# Patient Record
Sex: MALE | Employment: OTHER | URBAN - METROPOLITAN AREA
[De-identification: names, ages, dates, MRNs, and addresses within clinical notes are randomized per-mention and may not be internally consistent; named-entity substitution may affect disease eponyms.]

---

## 2018-01-12 ENCOUNTER — APPOINTMENT (OUTPATIENT)
Dept: CT IMAGING | Age: 68
DRG: 308 | End: 2018-01-12
Attending: EMERGENCY MEDICINE
Payer: MEDICARE

## 2018-01-12 ENCOUNTER — APPOINTMENT (OUTPATIENT)
Dept: GENERAL RADIOLOGY | Age: 68
DRG: 308 | End: 2018-01-12
Attending: EMERGENCY MEDICINE
Payer: MEDICARE

## 2018-01-12 ENCOUNTER — HOSPITAL ENCOUNTER (INPATIENT)
Age: 68
LOS: 4 days | Discharge: HOME HEALTH | DRG: 308 | End: 2018-01-16
Attending: EMERGENCY MEDICINE | Admitting: INTERNAL MEDICINE
Payer: MEDICARE

## 2018-01-12 DIAGNOSIS — Z45.02 AICD DISCHARGE: ICD-10-CM

## 2018-01-12 DIAGNOSIS — I46.9 CARDIAC ARREST (HCC): Primary | ICD-10-CM

## 2018-01-12 DIAGNOSIS — S00.03XA CONTUSION OF SCALP, INITIAL ENCOUNTER: ICD-10-CM
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
## 2018-01-12 DIAGNOSIS — R41.0 CONFUSION: ICD-10-CM

## 2018-01-12 PROBLEM — T14.8XXA CONTUSION: Status: ACTIVE | Noted: 2018-01-12

## 2018-01-12 LAB
ALBUMIN SERPL-MCNC: 3.9 G/DL (ref 3.4–5)
ALBUMIN/GLOB SERPL: 1 {RATIO} (ref 0.8–1.7)
ALP SERPL-CCNC: 79 U/L (ref 45–117)
ALT SERPL-CCNC: 168 U/L (ref 16–61)
ANION GAP SERPL CALC-SCNC: 15 MMOL/L (ref 3–18)
ARTERIAL PATENCY WRIST A: YES
AST SERPL-CCNC: 200 U/L (ref 15–37)
BASE EXCESS BLD CALC-SCNC: 8 MMOL/L
BASOPHILS # BLD: 0 K/UL (ref 0–0.1)
BASOPHILS NFR BLD: 0 % (ref 0–2)
BDY SITE: ABNORMAL
BILIRUB SERPL-MCNC: 0.7 MG/DL (ref 0.2–1)
BUN SERPL-MCNC: 19 MG/DL (ref 7–18)
BUN/CREAT SERPL: 10 (ref 12–20)
CALCIUM SERPL-MCNC: 9.4 MG/DL (ref 8.5–10.1)
CHLORIDE SERPL-SCNC: 95 MMOL/L (ref 100–108)
CK MB CFR SERPL CALC: 1.6 % (ref 0–4)
CK MB SERPL-MCNC: 1.2 NG/ML (ref 5–25)
CK SERPL-CCNC: 76 U/L (ref 39–308)
CO2 SERPL-SCNC: 28 MMOL/L (ref 21–32)
CREAT SERPL-MCNC: 1.86 MG/DL (ref 0.6–1.3)
DIFFERENTIAL METHOD BLD: ABNORMAL
EOSINOPHIL # BLD: 0.2 K/UL (ref 0–0.4)
EOSINOPHIL NFR BLD: 2 % (ref 0–5)
ERYTHROCYTE [DISTWIDTH] IN BLOOD BY AUTOMATED COUNT: 13.7 % (ref 11.6–14.5)
GAS FLOW.O2 O2 DELIVERY SYS: ABNORMAL L/MIN
GAS FLOW.O2 SETTING OXYMISER: 4 L/M
GLOBULIN SER CALC-MCNC: 3.9 G/DL (ref 2–4)
GLUCOSE BLD STRIP.AUTO-MCNC: 288 MG/DL (ref 70–110)
GLUCOSE SERPL-MCNC: 296 MG/DL (ref 74–99)
HCO3 BLD-SCNC: 30.3 MMOL/L (ref 22–26)
HCT VFR BLD AUTO: 41.8 % (ref 36–48)
HGB BLD-MCNC: 14 G/DL (ref 13–16)
LYMPHOCYTES # BLD: 6 K/UL (ref 0.9–3.6)
LYMPHOCYTES NFR BLD: 51 % (ref 21–52)
MAGNESIUM SERPL-MCNC: 1.7 MG/DL (ref 1.6–2.6)
MCH RBC QN AUTO: 30.8 PG (ref 24–34)
MCHC RBC AUTO-ENTMCNC: 33.5 G/DL (ref 31–37)
MCV RBC AUTO: 91.9 FL (ref 74–97)
MONOCYTES # BLD: 0.6 K/UL (ref 0.05–1.2)
MONOCYTES NFR BLD: 5 % (ref 3–10)
NEUTS SEG # BLD: 5 K/UL (ref 1.8–8)
NEUTS SEG NFR BLD: 42 % (ref 40–73)
O2/TOTAL GAS SETTING VFR VENT: 36 %
PCO2 BLD: 35.1 MMHG (ref 35–45)
PH BLD: 7.54 [PH] (ref 7.35–7.45)
PLATELET # BLD AUTO: 249 K/UL (ref 135–420)
PMV BLD AUTO: 10.5 FL (ref 9.2–11.8)
PO2 BLD: 176 MMHG (ref 80–100)
POTASSIUM SERPL-SCNC: 3.3 MMOL/L (ref 3.5–5.5)
PROT SERPL-MCNC: 7.8 G/DL (ref 6.4–8.2)
RBC # BLD AUTO: 4.55 M/UL (ref 4.7–5.5)
SAO2 % BLD: 100 % (ref 92–97)
SERVICE CMNT-IMP: ABNORMAL
SODIUM SERPL-SCNC: 138 MMOL/L (ref 136–145)
SPECIMEN TYPE: ABNORMAL
TOTAL RESP. RATE, ITRR: 20
TROPONIN I SERPL-MCNC: 0.02 NG/ML (ref 0–0.04)
WBC # BLD AUTO: 11.9 K/UL (ref 4.6–13.2)

## 2018-01-12 PROCEDURE — 82962 GLUCOSE BLOOD TEST: CPT

## 2018-01-12 PROCEDURE — 82803 BLOOD GASES ANY COMBINATION: CPT

## 2018-01-12 PROCEDURE — 94762 N-INVAS EAR/PLS OXIMTRY CONT: CPT

## 2018-01-12 PROCEDURE — 36600 WITHDRAWAL OF ARTERIAL BLOOD: CPT

## 2018-01-12 PROCEDURE — 71045 X-RAY EXAM CHEST 1 VIEW: CPT

## 2018-01-12 PROCEDURE — 83735 ASSAY OF MAGNESIUM: CPT | Performed by: EMERGENCY MEDICINE

## 2018-01-12 PROCEDURE — 85025 COMPLETE CBC W/AUTO DIFF WBC: CPT | Performed by: EMERGENCY MEDICINE

## 2018-01-12 PROCEDURE — 80053 COMPREHEN METABOLIC PANEL: CPT | Performed by: EMERGENCY MEDICINE

## 2018-01-12 PROCEDURE — 93005 ELECTROCARDIOGRAM TRACING: CPT

## 2018-01-12 PROCEDURE — 74011250636 HC RX REV CODE- 250/636

## 2018-01-12 PROCEDURE — 99285 EMERGENCY DEPT VISIT HI MDM: CPT

## 2018-01-12 PROCEDURE — 96375 TX/PRO/DX INJ NEW DRUG ADDON: CPT

## 2018-01-12 PROCEDURE — 82550 ASSAY OF CK (CPK): CPT | Performed by: EMERGENCY MEDICINE

## 2018-01-12 PROCEDURE — 74011250636 HC RX REV CODE- 250/636: Performed by: EMERGENCY MEDICINE

## 2018-01-12 PROCEDURE — 70450 CT HEAD/BRAIN W/O DYE: CPT

## 2018-01-12 PROCEDURE — 72125 CT NECK SPINE W/O DYE: CPT

## 2018-01-12 PROCEDURE — 96365 THER/PROPH/DIAG IV INF INIT: CPT

## 2018-01-12 PROCEDURE — 65620000000 HC RM CCU GENERAL

## 2018-01-12 PROCEDURE — 96367 TX/PROPH/DG ADDL SEQ IV INF: CPT

## 2018-01-12 PROCEDURE — 74011250636 HC RX REV CODE- 250/636: Performed by: INTERNAL MEDICINE

## 2018-01-12 RX ORDER — SODIUM CHLORIDE 0.9 % (FLUSH) 0.9 %
5-10 SYRINGE (ML) INJECTION EVERY 8 HOURS
Status: DISCONTINUED | OUTPATIENT
Start: 2018-01-12 | End: 2018-01-16 | Stop reason: HOSPADM

## 2018-01-12 RX ORDER — FENTANYL CITRATE 50 UG/ML
25 INJECTION, SOLUTION INTRAMUSCULAR; INTRAVENOUS ONCE
Status: COMPLETED | OUTPATIENT
Start: 2018-01-12 | End: 2018-01-12

## 2018-01-12 RX ORDER — ONDANSETRON 2 MG/ML
4 INJECTION INTRAMUSCULAR; INTRAVENOUS
Status: COMPLETED | OUTPATIENT
Start: 2018-01-12 | End: 2018-01-12

## 2018-01-12 RX ORDER — INSULIN LISPRO 100 [IU]/ML
INJECTION, SOLUTION INTRAVENOUS; SUBCUTANEOUS
Status: DISCONTINUED | OUTPATIENT
Start: 2018-01-12 | End: 2018-01-16 | Stop reason: HOSPADM

## 2018-01-12 RX ORDER — LORAZEPAM 2 MG/ML
INJECTION INTRAMUSCULAR
Status: COMPLETED
Start: 2018-01-12 | End: 2018-01-12

## 2018-01-12 RX ORDER — ONDANSETRON 2 MG/ML
4 INJECTION INTRAMUSCULAR; INTRAVENOUS
Status: DISPENSED | OUTPATIENT
Start: 2018-01-12 | End: 2018-01-13

## 2018-01-12 RX ORDER — ONDANSETRON 2 MG/ML
4 INJECTION INTRAMUSCULAR; INTRAVENOUS
Status: DISCONTINUED | OUTPATIENT
Start: 2018-01-12 | End: 2018-01-16 | Stop reason: HOSPADM

## 2018-01-12 RX ORDER — MAGNESIUM SULFATE HEPTAHYDRATE 40 MG/ML
2 INJECTION, SOLUTION INTRAVENOUS ONCE
Status: COMPLETED | OUTPATIENT
Start: 2018-01-12 | End: 2018-01-12

## 2018-01-12 RX ORDER — POTASSIUM CHLORIDE 7.45 MG/ML
10 INJECTION INTRAVENOUS
Status: COMPLETED | OUTPATIENT
Start: 2018-01-12 | End: 2018-01-12

## 2018-01-12 RX ORDER — HEPARIN SODIUM 5000 [USP'U]/ML
5000 INJECTION, SOLUTION INTRAVENOUS; SUBCUTANEOUS EVERY 8 HOURS
Status: DISCONTINUED | OUTPATIENT
Start: 2018-01-12 | End: 2018-01-14

## 2018-01-12 RX ORDER — ACETAMINOPHEN 325 MG/1
650 TABLET ORAL
Status: DISCONTINUED | OUTPATIENT
Start: 2018-01-12 | End: 2018-01-16 | Stop reason: HOSPADM

## 2018-01-12 RX ORDER — MAGNESIUM SULFATE 100 %
16 CRYSTALS MISCELLANEOUS AS NEEDED
Status: DISCONTINUED | OUTPATIENT
Start: 2018-01-12 | End: 2018-01-16 | Stop reason: HOSPADM

## 2018-01-12 RX ORDER — DEXTROSE 50 % IN WATER (D50W) INTRAVENOUS SYRINGE
25-50 AS NEEDED
Status: DISCONTINUED | OUTPATIENT
Start: 2018-01-12 | End: 2018-01-16 | Stop reason: HOSPADM

## 2018-01-12 RX ORDER — SODIUM CHLORIDE 0.9 % (FLUSH) 0.9 %
5-10 SYRINGE (ML) INJECTION AS NEEDED
Status: DISCONTINUED | OUTPATIENT
Start: 2018-01-12 | End: 2018-01-16 | Stop reason: HOSPADM

## 2018-01-12 RX ADMIN — POTASSIUM CHLORIDE 10 MEQ: 7.46 INJECTION, SOLUTION INTRAVENOUS at 15:48

## 2018-01-12 RX ADMIN — FENTANYL CITRATE 25 MCG: 50 INJECTION INTRAMUSCULAR; INTRAVENOUS at 14:54

## 2018-01-12 RX ADMIN — ONDANSETRON 4 MG: 2 INJECTION INTRAMUSCULAR; INTRAVENOUS at 19:44

## 2018-01-12 RX ADMIN — LORAZEPAM 0.5 MG: 2 INJECTION, SOLUTION INTRAMUSCULAR; INTRAVENOUS at 14:51

## 2018-01-12 RX ADMIN — AMIODARONE HYDROCHLORIDE 150 MG: 1.5 INJECTION, SOLUTION INTRAVENOUS at 17:20

## 2018-01-12 RX ADMIN — POTASSIUM CHLORIDE 10 MEQ: 7.46 INJECTION, SOLUTION INTRAVENOUS at 21:29

## 2018-01-12 RX ADMIN — MAGNESIUM SULFATE IN WATER 2 G: 40 INJECTION, SOLUTION INTRAVENOUS at 17:21

## 2018-01-12 RX ADMIN — AMIODARONE HYDROCHLORIDE 1 MG/MIN: 1.8 INJECTION, SOLUTION INTRAVENOUS at 20:00

## 2018-01-12 NOTE — H&P
History and Physical      NAME:  Chuck Saab   :   1950   MRN:   [id number]     Date/Time:  2018     CHIEF COMPLAINT: ICD shock     HISTORY OF PRESENT ILLNESS:     Mr. Carmelita Marshall is a 74 y.o.   male with a PMH of Ischemic cardiomyopathy with EF 20%, s/p BI-V ICD, chronic systolic CHF, ventricular tachycardia, CAD s/p CABG, HTN and DM-2 who presents with c/c of ICD shock. He was recently admitted 17-17 at [facility] for ventricular tachycardia and discharged home on amiodarone. Apparently his ICD has shocked him today. He fell down in the parking lot and become unresponsive. Per EMS, patient had no pulse and his rhythm was in [address] [facility]. His devise was discharged. Here in ED he is waking up and able to communicate. He denies chest pain. Cardiology and intensivist has been consulted and being admitted to ICU. Past Medical History:   Diagnosis Date    CAD (coronary artery disease)         Past Surgical History:   Procedure Laterality Date    CARDIAC SURG PROCEDURE UNLIST      CABG        Social History   Substance Use Topics    Smoking status: Unknown If Ever Smoked    Smokeless tobacco: Not on file    Alcohol use No        History reviewed. No pertinent family history.      No Known Allergies     Prior to Admission medications    Not on File       REVIEW OF SYSTEMS:     CONSTITUTIONAL: No Fever, No chills, No weight loss, No Night sweats  HEENT:  No epistaxis, No diff in swallowing  CVS: No chest pain, No palpitations, No syncope, No peripheral edema, No PND, No orthopnea  RS: No shortness of breath, No cough, No hemoptysis, No pleuritic chest pain  GI: No abd pain, No vomitting, No diarrhea, No hematemesis, No rectal bleeding, No acid reflux or heartburn  NEURO: No focal weakness, No headaches, No seizures  PSYCH: No anxiety, No depression  MUSCULOSKLETAL: No joint pain or swelling  : No hematuria or dysuria  SKIN: No rash      Physical Exam:    VITALS:    Vital signs reviewed; most recent are:    Visit Vitals    /81    Pulse 67    SpO2 100%     SpO2 Readings from Last 6 Encounters:   01/12/18 100%        No intake or output data in the 24 hours ending 01/12/18 3505       GENERAL: Not in acute distress  HEENT: pink conjunctiva, un icteric sclera,   NECK: No lymphadenopthy or thyroid swelling, JVD not seen  LYMPH: No supraclavicular or cervical or axillary nodes on both sides  CVS: S1S2, No murmurs, No gallop or rub  RS: CTA, No wheezing or crackles  Abd: Soft, non tender, not distended, No guarding, No rigidity  NEURO:  No focal neurologic deficits   Extrm: no leg edema or swelling   Skin: No rash      Labs:  Recent Results (from the past 24 hour(s))   CBC WITH AUTOMATED DIFF    Collection Time: 01/12/18  2:54 PM   Result Value Ref Range    WBC 11.9 4.6 - 13.2 K/uL    RBC 4.55 (L) 4.70 - 5.50 M/uL    HGB 14.0 13.0 - 16.0 g/dL    HCT 41.8 36.0 - 48.0 %    MCV 91.9 74.0 - 97.0 FL    MCH 30.8 24.0 - 34.0 PG    MCHC 33.5 31.0 - 37.0 g/dL    RDW 13.7 11.6 - 14.5 %    PLATELET 605 793 - 092 K/uL    MPV 10.5 9.2 - 11.8 FL    NEUTROPHILS 42 40 - 73 %    LYMPHOCYTES 51 21 - 52 %    MONOCYTES 5 3 - 10 %    EOSINOPHILS 2 0 - 5 %    BASOPHILS 0 0 - 2 %    ABS. NEUTROPHILS 5.0 1.8 - 8.0 K/UL    ABS. LYMPHOCYTES 6.0 (H) 0.9 - 3.6 K/UL    ABS. MONOCYTES 0.6 0.05 - 1.2 K/UL    ABS. EOSINOPHILS 0.2 0.0 - 0.4 K/UL    ABS.  BASOPHILS 0.0 0.0 - 0.1 K/UL    DF AUTOMATED     METABOLIC PANEL, COMPREHENSIVE    Collection Time: 01/12/18  2:54 PM   Result Value Ref Range    Sodium 138 136 - 145 mmol/L    Potassium 3.3 (L) 3.5 - 5.5 mmol/L    Chloride 95 (L) 100 - 108 mmol/L    CO2 28 21 - 32 mmol/L    Anion gap 15 3.0 - 18 mmol/L    Glucose 296 (H) 74 - 99 mg/dL    BUN 19 (H) 7.0 - 18 MG/DL    Creatinine 1.86 (H) 0.6 - 1.3 MG/DL    BUN/Creatinine ratio 10 (L) 12 - 20      GFR est AA 44 (L) >60 ml/min/1.73m2    GFR est non-AA 37 (L) >60 ml/min/1.73m2    Calcium 9.4 8.5 - 10.1 MG/DL    Bilirubin, total 0.7 0.2 - 1.0 MG/DL    ALT (SGPT) 168 (H) 16 - 61 U/L    AST (SGOT) 200 (H) 15 - 37 U/L    Alk. phosphatase 79 45 - 117 U/L    Protein, total 7.8 6.4 - 8.2 g/dL    Albumin 3.9 3.4 - 5.0 g/dL    Globulin 3.9 2.0 - 4.0 g/dL    A-G Ratio 1.0 0.8 - 1.7     CARDIAC PANEL,(CK, CKMB & TROPONIN)    Collection Time: 01/12/18  2:54 PM   Result Value Ref Range    CK 76 39 - 308 U/L    CK - MB 1.2 <3.6 ng/ml    CK-MB Index 1.6 0.0 - 4.0 %    Troponin-I, Qt. 0.02 0.0 - 0.045 NG/ML   MAGNESIUM    Collection Time: 01/12/18  2:54 PM   Result Value Ref Range    Magnesium 1.7 1.6 - 2.6 mg/dL   EKG, 12 LEAD, INITIAL    Collection Time: 01/12/18  3:55 PM   Result Value Ref Range    Ventricular Rate 68 BPM    Atrial Rate 68 BPM    P-R Interval 204 ms    QRS Duration 136 ms    Q-T Interval 602 ms    QTC Calculation (Bezet) 640 ms    Calculated P Axis 78 degrees    Calculated R Axis -132 degrees    Calculated T Axis -8 degrees    Diagnosis       Electronic ventricular pacemaker  No previous ECGs available           Active Problems:    * No active hospital problems. *      Assessment:       1. V-tac cardiac arrest s/p ICD shock  2. Recurrent Vent tachycardia recently admitted over [facility], on amiodarone  3. Ischemic cardiomyopathy with EF 20%, s/p BI-V ICD,   4. Chronic systolic CHF,   5. Paroxysmal Afib  6. CAD s/p CABG,   7. HTN, controlled  8. DM-2, uncontrolled with hyperglycemia  9. JOELLE    Plan:       · Admit to ICU  · Started on IV amiodarone drip per cardiology  · Cardiology considering  ventricular tachycardia ablation  · Will review his other home medications when available  · Patient on eliquis at home  · Monitor electrolytes and replete as needed  · Monitor blood glucose.  Will keep him on SSI for now  · Full code  · D/w his brother at bedside    Total time:  70 minutes             _______________________________________________________________________        Attending Physician:  Marline Espino MD

## 2018-01-12 NOTE — IP AVS SNAPSHOT
[address] [address] 
[phone] Patient: Jeff Chaeny MRN: [id number] About your hospitalization You were admitted on:  January 12, 2018 You last received care in the:  [facility] 2 CV INTNSV CARE You were discharged on:  January 16, 2018 Why you were hospitalized Your primary diagnosis was:  Not on File Your diagnoses also included:  Cardiac Arrest (Hcc), Contusion, Hypokalemia, Metabolic Alkalosis, Dm Type 2 (Diabetes Mellitus, Type 2) (Hcc), Systolic Dysfunction Follow-up Information Follow up With Details Comments Contact Info None   None ([phone]) Patient stated that they have no PCP On 1/17/2018 @ 1:20 with dr.michael bright On 1/22/2018 @ 10:45 with md alejandro multani pls bring red d/c chart with the [facility] appt/time Your Scheduled Appointments Tuesday January 30, 2018  1:45 PM EST  
ESTABLISHED PATIENT with Liban Nogueira MD  
[facility] ([facility]) [address] [place] [address]  
[phone] Discharge Orders None A check larisa indicates which time of day the medication should be taken. My Medications START taking these medications Instructions Each Dose to Equal  
 Morning Noon Evening Bedtime  
 aspirin 81 mg chewable tablet Start taking on:  1/17/2018 Your last dose was:  0900 Take 1 Tab by mouth daily. 81 mg  
    
  
   
   
   
  
 polyethylene glycol 17 gram packet Commonly known as:  Ladi Farrier Your last dose was:  0900 Your next dose is:  tomorrow Take 1 Packet by mouth daily. 17 g  
    
  
   
   
   
  
 spironolactone 25 mg tablet Commonly known as:  ALDACTONE Start taking on:  1/17/2018 Your last dose was:  0900 Your next dose is:  tomorrow Take 1 Tab by mouth daily.   
 25 mg  
    
  
   
   
   
  
  
 CONTINUE taking these medications Instructions Each Dose to Equal  
 Morning Noon Evening Bedtime * allopurinol 100 mg tablet Commonly known as:  Nomi Christie Your last dose was:  0900 Take 200 mg by mouth every evening. Indications: prevention of acute gout attack, 300mg in am/ 200 mg in the evening  
 200 mg  
    
  
   
   
  
   
  
 * allopurinol 300 mg tablet Commonly known as:  Nomi Pratik Your next dose is:  6pm  
   
 Take 300 mg by mouth daily. Indications: Total = 500mg daily 300 mg  
    
  
   
   
  
   
  
 AMARYL 4 mg tablet Generic drug:  glimepiride Your last dose was:  Prior to admission Your next dose is: With dinner Take 4 mg by mouth two (2) times daily (with meals). 4 mg  
    
  
   
   
  
   
  
 amiodarone 200 mg tablet Commonly known as:  CORDARONE Your last dose was:  0900 Your next dose is: With dinner Take 200 mg by mouth two (2) times daily (with meals). 200 mg  
    
  
   
   
  
   
  
 atorvastatin 20 mg tablet Commonly known as:  LIPITOR Take 20 mg by mouth nightly. 20 mg  
    
   
   
   
  
  
 carvedilol 3.125 mg tablet Commonly known as:  [place] Dine Your last dose was:  yesterday Your next dose is: With dinner Take  by mouth two (2) times daily (with meals). cyclobenzaprine 10 mg tablet Commonly known as:  FLEXERIL Take 10 mg by mouth three (3) times daily as needed for Muscle Spasm(s). 10 mg  
    
   
   
   
  
 docusate sodium 100 mg capsule Commonly known as:  Mandy  Your last dose was:  today Take 100 mg by mouth daily. 100 mg  
    
  
   
   
   
  
 ELIQUIS 5 mg tablet Generic drug:  apixaban Your last dose was:  0900 Your next dose is:  9pm  
   
 Take 5 mg by mouth every twelve (12) hours. Indications: PREVENT THROMBOEMBOLISM IN CHRONIC ATRIAL FIBRILLATION  
 5 mg  
    
  
   
   
   
  
  
 famotidine 20 mg tablet Pt's c/o Urination difficulty and Dizzy feeling upon ambulation Commonly known as:  PEPCID Your last dose was:  0900 Your next dose is:  Dinner time Take 20 mg by mouth two (2) times a day. 20 mg  
    
  
   
   
  
   
  
 magnesium oxide 400 mg tablet Commonly known as:  MAG-OX Your last dose was:  0900 Your next dose is:  tomorrow Take 400 mg by mouth daily. 400 mg  
    
  
   
   
   
  
 metFORMIN 850 mg tablet Commonly known as:  GLUCOPHAGE Your last dose was:  Prior to admission Your next dose is: With dinner Take  by mouth two (2) times a day. potassium chloride SA 10 mEq capsule Commonly known as:  Louanna Sample Your last dose was:  0900 Your next dose is:  bedtime Take 20 mEq by mouth two (2) times a day. 20 mEq Senna 8.6 mg tablet Generic drug:  senna Take 1 Tab by mouth two (2) times daily as needed for Constipation. 1 Tab * Notice: This list has 2 medication(s) that are the same as other medications prescribed for you. Read the directions carefully, and ask your doctor or other care provider to review them with you. STOP taking these medications   
 torsemide 20 mg tablet Commonly known as:  DEMADEX Where to Get Your Medications Information on where to get these meds will be given to you by the nurse or doctor. ! Ask your nurse or doctor about these medications  
  aspirin 81 mg chewable tablet  
 polyethylene glycol 17 gram packet  
 spironolactone 25 mg tablet Discharge Instructions None [facility] Announcement We are excited to announce that we are making your provider's discharge notes available to you in [facility]. You will see these notes when they are completed and signed by the physician that discharged you from your recent hospital stay.   If you have any questions or concerns about any information you see in [employer], please call the Health Information Department where you were seen or reach out to your Primary Care Provider for more information about your plan of care. Introducing [facility]! Austin Bergman introduces Bay[employer] patient portal. Now you can access parts of your medical record, email your doctor's office, and request medication refills online. 1. In your internet browser, go to [email or web address] 2. Click on the First Time User? Click Here link in the Sign In box. You will see the New Member Sign Up page. 3. Enter your Baytex Access Code exactly as it appears below. You will not need to use this code after youve completed the sign-up process. If you do not sign up before the expiration date, you must request a new code. · Baytex Access Code: [email or web address] Expires: 4/16/2018  3:34 PM 
 
4. Enter the last four digits of your Social Security Number (xxxx) and Date of Birth (mm/dd/yyyy) as indicated and click Submit. You will be taken to the next sign-up page. 5. Create a Baytex ID. This will be your Baytex login ID and cannot be changed, so think of one that is secure and easy to remember. 6. Create a Baytex password. You can change your password at any time. 7. Enter your Password Reset Question and Answer. This can be used at a later time if you forget your password. 8. Enter your e-mail address. You will receive e-mail notification when new information is available in [address] 9. Click Sign Up. You can now view and download portions of your medical record. 10. Click the Download Summary menu link to download a portable copy of your medical information. If you have questions, please visit the Frequently Asked Questions section of the Baytex website. Remember, [employer]tex is NOT to be used for urgent needs. For medical emergencies, dial 911. Now available from your iPhone and Android! Unresulted Labs-Please follow up with your PCP about these lab tests Order Current Status ALDOSTERONE/RENIN ACTIVITY Preliminary result Providers Seen During Your Hospitalization Provider Specialty Primary office phone Ricky Lino MD Emergency Medicine [phone] Amanda Alva MD Internal Medicine [phone] Duy Hi MD [facility] [phone] Liss Perez MD Internal Medicine [phone] Marita Heimlich, MD Internal Medicine [phone] Leroy Ivy MD Internal Medicine [phone] Your Primary Care Physician (PCP) Primary Care Physician Office Phone Office Fax NONE ** None ** ** None ** You are allergic to the following No active allergies Recent Documentation Height Weight BMI Smoking Status 1.88 m 86.4 kg 24.46 kg/m2 Unknown If Ever Smoked Emergency Contacts Name Discharge Info Relation Home Work Mobile Ricky Kohli DECLINED CAREGIVER [4] Brother [24] [phone] Kota Hawks DECLINED CAREGIVER [4] Brother [24] [phone] Luli Andrews DECLINED CAREGIVER [4] Sister [23] [phone] Patient Belongings The following personal items are in your possession at time of discharge: 
  Dental Appliances: None  Visual Aid: None      Home Medications: None   Jewelry: None  Clothing: None    Other Valuables: None Discharge Instructions Attachments/References ICD (IMPLANTABLE CARDIOVERTER-DEFIBRILLATOR): GENERAL INFO (ENGLISH) ICD SHOCKS: GENERAL INFO (ENGLISH) ATRIAL FIBRILLATION (ENGLISH) ELECTROLYTE IMBALANCE (ENGLISH) HYPOKALEMIA (ENGLISH) Patient Handouts Learning About ICDs (Implantable Cardioverter-Defibrillators) What is an ICD (implantable cardioverter-defibrillator)? An ICD (implantable cardioverter-defibrillator) is a small, battery-powered device. It fixes serious changes in your heartbeat.  ICDs are used in people who have had a life-threatening heart rhythm or are at high risk of having one. The ICD is placed under the skin of the chest. It's attached to one or two wires (called leads). Most of the time, these leads go into the heart through a vein. How does an ICD work? An ICD is always checking your heart rate and rhythm. If the ICD detects a life-threatening, rapid heart rhythm, it tries to slow the rhythm to get it back to normal. If the bad rhythm doesn't stop, the ICD sends an electric shock to the heart. This restores a normal rhythm. The device then goes back to its watchful mode. An ICD also can fix a heart rate that is too fast or too slow. It does this without using a shock. It can send out electrical pulses to speed up a heart rate that is too slow. Or it can slow down a fast heart rate by matching the pace and bringing the heart rate back to normal. 
Your doctor will check the ICD regularly to make sure it is working right and isn't causing any problems. Your doctor will also check the battery to see if it needs to be replaced. How is an ICD placed? Your doctor will put the ICD under the skin in your chest during minor surgery. You will likely have medicine to make you feel relaxed and sleepy during the surgery. Your doctor makes a small cut (incision) in your upper chest. He or she puts one or two leads (wires) through the cut. Most of the time, the leads go into a large blood vessel in the upper chest. Then your doctor guides the leads through the blood vessel into your heart. Your doctor connects the leads to the ICD and places it in your chest. Then the incision is closed. Your doctor also programs the ICD. Most people spend the night in the hospital, just to make sure that the device is working and that there are no problems from the surgery. How does it feel to get a shock? The shock from an ICD hurts briefly. People feel it in different ways. It's been described as feeling like a punch in the chest. But the shock is a sign that the ICD is doing its job. It's there to save your life. You won't feel any pain if the ICD uses electrical pulses to fix a heart rate that is too fast or too slow. There's no way to know how often a shock might occur. It might never happen. Not knowing when or if a shock might happen may make you nervous. Knowing what to do if you get shocked can help. Ask your doctor for an action plan. This plan will guide you step-by-step if a shock happens. What else should you know? You can live a normal life with your ICD. Here are a few tips for living well with your ICD. · Avoid strong magnetic and electrical fields. These can keep your device from working right. Most office equipment and home appliances are safe to use. Check with your doctor about which things you should use with caution and which you should stay away from. · Be sure that any doctor, dentist, or other health professional you see knows that you have an ICD. · Always carry a card that tells what kind of device you have. Wear medical alert jewelry that says you have an ICD. You can buy this at most drugstores. · If you do get a shock, follow your action plan for what to do. · You can lead an active life with an ICD. Ask your doctor what sort of activity and intensity is safe for you. As you plan for your future and your end of life, be sure to include plans for your ICD. You can make the decision to turn off your ICD as part of the medical treatment you want at the end of life. Follow-up care is a key part of your treatment and safety. Be sure to make and go to all appointments, and call your doctor if you are having problems. It's also a good idea to know your test results and keep a list of the medicines you take. Where can you learn more? Go to http://josiane-halima.info/. Enter J468 in the search box to learn more about \"Learning About ICDs (Implantable Cardioverter-Defibrillators). \" 
Current as of: September 21, 2016 Content Version: 11.4 © 8341-5740 [employer]. Care instructions adapted under license by [employer] (which disclaims liability or warranty for this information). If you have questions about a medical condition or this instruction, always ask your healthcare professional. [address] any warranty or liability for your use of this information. Learning About ICD Shocks What are ICDs and ICD shocks? An implantable cardioverter-defibrillator (ICD) is a device that is placed under the skin of your chest. It has thin wires (called leads). Most of the time, these leads are placed inside the heart. The ICD is always checking your heart. If it detects a life-threatening rapid heart rhythm, it tries to slow the rhythm to get it back to normal. If the dangerous rhythm does not stop, the ICD sends an electric shock to the heart to restore a normal rhythm. The device then goes back to its watchful mode. The idea of living with an ICD and getting shocked worries some people. The shock can be uncomfortable. It may feel like you are being kicked in the chest. For many people, getting a shock can cause anxiety and depression. It's normal to be worried about living with an ICD. After all, you don't know when a shock might occur, and a shock could be a reminder that your heart is not as healthy as it could be. But an ICD is an important part of your treatment. It can save your life. If you take a few simple steps, you can feel better about having an ICD. How can you get over your fears about the ICD? Know your ICD treatment · Learn how the ICD works, what it does, and how it keeps you safe. This can help reduce any anxiety you may feel. · Keep your regular doctor appointments. Your doctor: ¨ Sets both the rate at which a shock will occur and the level of shock needed to restore your heart to a normal rate. ¨ Checks to see whether the ICD has given you any shocks since your last visit. This helps your doctor know if your medicines need to be adjusted. ¨ Checks the ICD battery and replaces it as needed. · Talk with others who have an ICD. Ask them if they have been shocked and what it was like. Ask them how they cope with it. Talking with others can help you feel better. · Always carry your ICD identity card, a list of all the medicines you are taking, and your doctor's name and phone number. This will help you get the best possible treatment if you get a shock and need help. Make an action plan Talk to your doctor about making an action plan for what to do if you get shocked. Here is an example: · After one shock: 
¨ Call 911 or other emergency services right away if you feel bad or have symptoms like chest pain. ¨ Call your doctor soon if you feel fine right away after the shock. Your doctor may want to talk about the shock and schedule a follow-up visit. · If you get a second shock in a 24-hour period, call your doctor right away. Call even if you feel fine right away. Stay calm after a shock · Follow the action plan you made with your doctor. · Do some breathing exercises. They may help you relax. ¨ Sit or lie in a comfortable position. Put one hand on your belly just below your ribs and the other hand on your chest. 
¨ Take a deep breath in through your nose, and let your belly push your hand out. Your chest should not move. ¨ Breathe out through pursed lips as if you were whistling. Feel the hand on your belly go in, and use it to push all the air out. ¨ Breathe in and out like this until you feel more relaxed. · Keep a good attitude. When you've had a shock, you may question how healthy you are or worry about getting another shock.  But try to focus on the positive things in your life, like loving relationships, pleasant activities, or good friends. · Don't make changes in what you do. You may want to avoid an action because you think it caused the shock. But a shock can occur at any time, and you can't prevent shocks by your actions alone. Don't stop doing things you enjoy to try to avoid a shock. Follow-up care is a key part of your treatment and safety. Be sure to make and go to all appointments, and call your doctor if you are having problems. It's also a good idea to know your test results and keep a list of the medicines you take. Where can you learn more? Go to http://joisaneBrowsterhalima.info/. Enter I453 in the search box to learn more about \"Learning About ICD Shocks. \" Current as of: September 21, 2016 Content Version: 11.4 © 8570-8891 [employer]. Care instructions adapted under license by [employer] (which disclaims liability or warranty for this information). If you have questions about a medical condition or this instruction, always ask your healthcare professional. [place] [address] any warranty or liability for your use of this information. Atrial Fibrillation: Care Instructions Your Care Instructions Atrial fibrillation is an irregular and often fast heartbeat. Treating this condition is important for several reasons. It can cause blood clots, which can travel from your heart to your brain and cause a stroke. If you have a fast heartbeat, you may feel lightheaded, dizzy, and weak. An irregular heartbeat can also increase your risk for heart failure. Atrial fibrillation is often the result of another heart condition, such as high blood pressure or coronary artery disease. Making changes to improve your heart condition will help you stay healthy and active. Follow-up care is a key part of your treatment and safety.  Be sure to make and go to all appointments, and call your doctor if you are having problems. It's also a good idea to know your test results and keep a list of the medicines you take. How can you care for yourself at home? Medicines ? · Take your medicines exactly as prescribed. Call your doctor if you think you are having a problem with your medicine. You will get more details on the specific medicines your doctor prescribes. ? · If your doctor has given you a blood thinner to prevent a stroke, be sure you get instructions about how to take your medicine safely. Blood thinners can cause serious bleeding problems. ? · Do not take any vitamins, over-the-counter drugs, or herbal products without talking to your doctor first. ? Lifestyle changes ? · Do not smoke. Smoking can increase your chance of a stroke and heart attack. If you need help quitting, talk to your doctor about stop-smoking programs and medicines. These can increase your chances of quitting for good. ? · Eat a heart-healthy diet. ? · Stay at a healthy weight. Lose weight if you need to.  
? · Limit alcohol to 2 drinks a day for men and 1 drink a day for women. Too much alcohol can cause health problems. ? · Avoid colds and flu. Get a pneumococcal vaccine shot. If you have had one before, ask your doctor whether you need another dose. Get a flu shot every year. If you must be around people with colds or flu, wash your hands often. Activity ? · If your doctor recommends it, get more exercise. Walking is a good choice. Bit by bit, increase the amount you walk every day. Try for at least 30 minutes on most days of the week. You also may want to swim, bike, or do other activities. Your doctor may suggest that you join a cardiac rehabilitation program so that you can have help increasing your physical activity safely. ? · Start light exercise if your doctor says it is okay.  Even a small amount will help you get stronger, have more energy, and manage stress. Walking is an easy way to get exercise. Start out by walking a little more than you did in the hospital. Gradually increase the amount you walk. ? · When you exercise, watch for signs that your heart is working too hard. You are pushing too hard if you cannot talk while you are exercising. If you become short of breath or dizzy or have chest pain, sit down and rest immediately. ? · Check your pulse regularly. Place two fingers on the artery at the palm side of your wrist, in line with your thumb. If your heartbeat seems uneven or fast, talk to your doctor. When should you call for help? Call 911 anytime you think you may need emergency care. For example, call if: 
? · You have symptoms of a heart attack. These may include: ¨ Chest pain or pressure, or a strange feeling in the chest. 
¨ Sweating. ¨ Shortness of breath. ¨ Nausea or vomiting. ¨ Pain, pressure, or a strange feeling in the back, neck, jaw, or upper belly or in one or both shoulders or arms. ¨ Lightheadedness or sudden weakness. ¨ A fast or irregular heartbeat. After you call 911, the  may tell you to chew 1 adult-strength or 2 to 4 low-dose aspirin. Wait for an ambulance. Do not try to drive yourself. ? · You have symptoms of a stroke. These may include: 
¨ Sudden numbness, tingling, weakness, or loss of movement in your face, arm, or leg, especially on only one side of your body. ¨ Sudden vision changes. ¨ Sudden trouble speaking. ¨ Sudden confusion or trouble understanding simple statements. ¨ Sudden problems with walking or balance. ¨ A sudden, severe headache that is different from past headaches. ? · You passed out (lost consciousness). ?Call your doctor now or seek immediate medical care if: 
? · You have new or increased shortness of breath. ? · You feel dizzy or lightheaded, or you feel like you may faint. ? · Your heart rate becomes irregular. ? · You can feel your heart flutter in your chest or skip heartbeats. Tell your doctor if these symptoms are new or worse. ? Watch closely for changes in your health, and be sure to contact your doctor if you have any problems. Where can you learn more? Go to http://josiane-halima.info/. Enter U020 in the search box to learn more about \"Atrial Fibrillation: Care Instructions. \" Current as of: September 21, 2016 Content Version: 11.4 © 6927-6665 [employer]. Care instructions adapted under license by [employer] (which disclaims liability or warranty for this information). If you have questions about a medical condition or this instruction, always ask your healthcare professional. [address] any warranty or liability for your use of this information. Electrolyte Imbalance: Care Instructions Your Care Instructions Electrolytes are minerals in your blood. They include sodium, potassium, calcium, and magnesium. When they are not at the right levels, you can feel very ill. You may not know what is causing it, but you know something is wrong. You may feel weak or numb, have muscle spasms, or twitch. Your heart may beat fast. Symptoms are different with each mineral. Too much is as bad as too little. Minerals help keep your body working as it should. Vomiting, diarrhea, and fever can cause you to lose minerals. A problem with your kidneys can tip a mineral out of balance. So can taking certain medicines. Your doctor may do more tests. He or she may change your medicine and diet. If you are low in one or more minerals, they may be given through a tube into your vein (IV). Your doctor may have you take or drink special fluids or pills. If your kidneys are failing, your blood may be filtered. This is called dialysis. It can put the minerals back in balance. Follow-up care is a key part of your treatment and safety. Be sure to make and go to all appointments, and call your doctor if you are having problems. It's also a good idea to know your test results and keep a list of the medicines you take. How can you care for yourself at home? · Take your medicines exactly as prescribed. Call your doctor if you have any problems with your medicines. You will get more details on the specific medicines your doctor prescribes. · Do not take any medicine without talking to your doctor first. This includes prescription, over-the-counter, and herbal medicines. · If you have kidney, heart, or liver disease and have to limit fluids, talk with your doctor before you increase the amount of fluids you drink. · Your doctor or dietitian may give you a diet plan to help balance your minerals. Follow the diet carefully. When should you call for help? Call 911 anytime you think you may need emergency care. For example, call if: 
? · You passed out (lost consciousness). ? · Your heartbeat seems to be irregular. It might be speeding up and then slowing down or skipping beats. ?Call your doctor now or seek immediate medical care if: 
? · You have muscle aches. ? · You feel very weak. ? · You are confused or cannot think clearly. ? Watch closely for changes in your health, and be sure to contact your doctor if: 
? · You do not get better as expected. Where can you learn more? Go to http://josiane-halima.info/. Enter K705 in the search box to learn more about \"Electrolyte Imbalance: Care Instructions. \" Current as of: May 12, 2017 Content Version: 11.4 © 7181-3300 [employer]. Care instructions adapted under license by [employer] (which disclaims liability or warranty for this information).  If you have questions about a medical condition or this instruction, always ask your healthcare professional. Janna Sandoval [facility] disclaims any warranty or liability for your use of this information. Hypokalemia: Care Instructions Your Care Instructions Hypokalemia (say \"rk-ga-hdo-BHAVIN-katie-uh\") is a low level of potassium. The heart, muscles, kidneys, and nervous system all need potassium to work well. This problem has many different causes. Kidney problems, diet, and medicines like diuretics and laxatives can cause it. So can vomiting or diarrhea. In some cases, cancer is the cause. Your doctor may do tests to find the cause of your low potassium levels. You may need medicines to bring your potassium levels back to normal. You may also need regular blood tests to check your potassium. If you have very low potassium, you may need intravenous (IV) medicines. You also may need tests to check the electrical activity of your heart. Heart problems caused by low potassium levels can be very serious. Follow-up care is a key part of your treatment and safety. Be sure to make and go to all appointments, and call your doctor if you are having problems. It's also a good idea to know your test results and keep a list of the medicines you take. How can you care for yourself at home? · If your doctor recommends it, eat foods that have a lot of potassium. These include fresh fruits, juices, and vegetables. They also include nuts, beans, and milk. · Be safe with medicines. If your doctor prescribes medicines or potassium supplements, take them exactly as directed. Call your doctor if you have any problems with your medicines. · Get your potassium levels tested as often as your doctor tells you. When should you call for help? Call 911 anytime you think you may need emergency care. For example, call if: 
? · You feel like your heart is missing beats. Heart problems caused by low potassium can cause death. ? · You passed out (lost consciousness). ? · You have a seizure. ?Call your doctor now or seek immediate medical care if: 
? · You feel weak or unusually tired. ? · You have severe arm or leg cramps. ? · You have tingling or numbness. ? · You feel sick to your stomach, or you vomit. ? · You have belly cramps. ? · You feel bloated or constipated. ? · You have to urinate a lot. ? · You feel very thirsty most of the time. ? · You are dizzy or lightheaded, or you feel like you may faint. ? · You feel depressed, or you lose touch with reality. ? Watch closely for changes in your health, and be sure to contact your doctor if: 
? · You do not get better as expected. Where can you learn more? Go to http://josiane-halima.info/. Enter G358 in the search box to learn more about \"Hypokalemia: Care Instructions. \" Current as of: May 12, 2017 Content Version: 11.4 © 8135-8498 [employer]. Care instructions adapted under license by [employer] (which disclaims liability or warranty for this information). If you have questions about a medical condition or this instruction, always ask your healthcare professional. [address] any warranty or liability for your use of this information. Please provide this summary of care documentation to your next provider. Signatures-by signing, you are acknowledging that this After Visit Summary has been reviewed with you and you have received a copy. Patient Signature:  ____________________________________________________________ Date:  ____________________________________________________________  
  
[facility] Provider Signature:  ____________________________________________________________ Date:  ____________________________________________________________

## 2018-01-12 NOTE — IP AVS SNAPSHOT
[address] [address] [phone] Patient: Corrina Rivera MRN: [id number] A check larisa indicates which time of day the medication should be taken. My Medications START taking these medications Instructions Each Dose to Equal  
 Morning Noon Evening Bedtime  
 aspirin 81 mg chewable tablet Start taking on:  1/17/2018 Your last dose was:  0900 Take 1 Tab by mouth daily. 81 mg  
    
  
   
   
   
  
 polyethylene glycol 17 gram packet Commonly known as:  Cookie Iván Your last dose was:  0900 Your next dose is:  tomorrow Take 1 Packet by mouth daily. 17 g  
    
  
   
   
   
  
 spironolactone 25 mg tablet Commonly known as:  ALDACTONE Start taking on:  1/17/2018 Your last dose was:  0900 Your next dose is:  tomorrow Take 1 Tab by mouth daily. 25 mg CONTINUE taking these medications Instructions Each Dose to Equal  
 Morning Noon Evening Bedtime * allopurinol 100 mg tablet Commonly known as:  Pheobe Jean Baptiste Your last dose was:  0900 Take 200 mg by mouth every evening. Indications: prevention of acute gout attack, 300mg in am/ 200 mg in the evening  
 200 mg  
    
  
   
   
  
   
  
 * allopurinol 300 mg tablet Commonly known as:  Pheobe Jean Baptiste Your next dose is:  6pm  
   
 Take 300 mg by mouth daily. Indications: Total = 500mg daily 300 mg  
    
  
   
   
  
   
  
 AMARYL 4 mg tablet Generic drug:  glimepiride Your last dose was:  Prior to admission Your next dose is: With dinner Take 4 mg by mouth two (2) times daily (with meals). 4 mg  
    
  
   
   
  
   
  
 amiodarone 200 mg tablet Commonly known as:  CORDARONE Your last dose was:  0900 Your next dose is: With dinner Take 200 mg by mouth two (2) times daily (with meals).   
 200 mg  
    
  
   
   
  
   
  
 atorvastatin 20 mg tablet Commonly known as:  LIPITOR Take 20 mg by mouth nightly. 20 mg  
    
   
   
   
  
  
 carvedilol 3.125 mg tablet Commonly known as:  Sigrid Hard Your last dose was:  yesterday Your next dose is: With dinner Take  by mouth two (2) times daily (with meals). cyclobenzaprine 10 mg tablet Commonly known as:  FLEXERIL Take 10 mg by mouth three (3) times daily as needed for Muscle Spasm(s). 10 mg  
    
   
   
   
  
 docusate sodium 100 mg capsule Commonly known as:  Arlean Lavender Your last dose was:  today Take 100 mg by mouth daily. 100 mg  
    
  
   
   
   
  
 ELIQUIS 5 mg tablet Generic drug:  apixaban Your last dose was:  0900 Your next dose is:  9pm  
   
 Take 5 mg by mouth every twelve (12) hours. Indications: PREVENT THROMBOEMBOLISM IN CHRONIC ATRIAL FIBRILLATION  
 5 mg  
    
  
   
   
   
  
  
 famotidine 20 mg tablet Commonly known as:  PEPCID Your last dose was:  0900 Your next dose is:  Dinner time Take 20 mg by mouth two (2) times a day. 20 mg  
    
  
   
   
  
   
  
 magnesium oxide 400 mg tablet Commonly known as:  MAG-OX Your last dose was:  0900 Your next dose is:  tomorrow Take 400 mg by mouth daily. 400 mg  
    
  
   
   
   
  
 metFORMIN 850 mg tablet Commonly known as:  GLUCOPHAGE Your last dose was:  Prior to admission Your next dose is: With dinner Take  by mouth two (2) times a day. potassium chloride SA 10 mEq capsule Commonly known as:  Billy Dan Your last dose was:  0900 Your next dose is:  bedtime Take 20 mEq by mouth two (2) times a day. 20 mEq Senna 8.6 mg tablet Generic drug:  senna Take 1 Tab by mouth two (2) times daily as needed for Constipation. 1 Tab * Notice: This list has 2 medication(s) that are the same as other medications prescribed for you. Read the directions carefully, and ask your doctor or other care provider to review them with you. STOP taking these medications   
 torsemide 20 mg tablet Commonly known as:  DEMADEX Where to Get Your Medications Information on where to get these meds will be given to you by the nurse or doctor. ! Ask your nurse or doctor about these medications  
  aspirin 81 mg chewable tablet  
 polyethylene glycol 17 gram packet  
 spironolactone 25 mg tablet

## 2018-01-12 NOTE — ED TRIAGE NOTES
Pt BIBA for cardiac arrest, pt defibrillator shocked him out of vtach pt aao*2.  Pt paced on monitor no s/s  Of acute cardiac or respiratory distress, pt has laceration to posterior head, oozing has stopped family at bedside EMS placed pt in c  collar and back board continue to monitor pt

## 2018-01-12 NOTE — ED PROVIDER NOTES
EMERGENCY DEPARTMENT HISTORY AND PHYSICAL EXAM    2:51 PM      Date: 1/12/2018  Patient Name: Bobby Salamanca    History of Presenting Illness     Chief Complaint   Patient presents with    Fall    Rapid Heart Rate         History Provided By: EMS    Chief Complaint: fall  Duration:  Minutes  Timing:  Acute  Location: head  Severity: Severe  Modifying Factors: Pt fell backwards in parking lot and hit his head. His defibrillator went off. Associated Symptoms:       Additional History (Context): Bobby Salamanca is a 74 y.o. male with h/o CAD, AICD, VT who presents after a fall today. Per bystanders pt fell backwards and was found to be unresponsive and pulseless. cpr started and continued on EMS arrival. Pt with some spontaneous respirations which EMS supported en route. Per family that later arrived pt has a \"bad heart\" and had a recent admission for VT arrest at [facility]. No other history able to be obtained 2/2 pt unresponsive. PCP: None        Past History     Past Medical History:  Past Medical History:   Diagnosis Date    CAD (coronary artery disease)        Past Surgical History:  Past Surgical History:   Procedure Laterality Date    CARDIAC SURG PROCEDURE UNLIST      CABG        Family History:  History reviewed. No pertinent family history. Social History:  Social History   Substance Use Topics    Smoking status: Unknown If Ever Smoked    Smokeless tobacco: None    Alcohol use No       Allergies:  No Known Allergies      Review of Systems       Review of Systems   Unable to perform ROS: Patient unresponsive         Physical Exam     Visit Vitals    /66    Pulse 63    Temp 98.2 °F (36.8 °C)    Resp 16    SpO2 100%         Physical Exam   HENT:   Fractured front tooth. Oropharynx clear. Midface stable. Contusion to posterior occiput. No crepitus. Eyes: Conjunctivae are normal. Pupils are equal, round, and reactive to light. Neck: Neck supple. No JVD present.  No tracheal deviation present. Cardiovascular: Normal rate and regular rhythm. Pulmonary/Chest: No stridor. Respirations supported with BVM but breath sounds present bilaterally. No wheezing or rales appreciated. Abdominal: Soft. Bowel sounds are normal. He exhibits no distension. Musculoskeletal: He exhibits no deformity. No midline spinal step off. Pelvis stable   Neurological:   Withdraws to pain. Eyes open spontaneously. Not following commands. No facial asymmetry. Skin: Skin is warm and dry. Nursing note and vitals reviewed. Diagnostic Study Results     Labs -  Recent Results (from the past 12 hour(s))   CBC WITH AUTOMATED DIFF    Collection Time: 01/12/18  2:54 PM   Result Value Ref Range    WBC 11.9 4.6 - 13.2 K/uL    RBC 4.55 (L) 4.70 - 5.50 M/uL    HGB 14.0 13.0 - 16.0 g/dL    HCT 41.8 36.0 - 48.0 %    MCV 91.9 74.0 - 97.0 FL    MCH 30.8 24.0 - 34.0 PG    MCHC 33.5 31.0 - 37.0 g/dL    RDW 13.7 11.6 - 14.5 %    PLATELET 293 912 - 422 K/uL    MPV 10.5 9.2 - 11.8 FL    NEUTROPHILS 42 40 - 73 %    LYMPHOCYTES 51 21 - 52 %    MONOCYTES 5 3 - 10 %    EOSINOPHILS 2 0 - 5 %    BASOPHILS 0 0 - 2 %    ABS. NEUTROPHILS 5.0 1.8 - 8.0 K/UL    ABS. LYMPHOCYTES 6.0 (H) 0.9 - 3.6 K/UL    ABS. MONOCYTES 0.6 0.05 - 1.2 K/UL    ABS. EOSINOPHILS 0.2 0.0 - 0.4 K/UL    ABS.  BASOPHILS 0.0 0.0 - 0.1 K/UL    DF AUTOMATED     METABOLIC PANEL, COMPREHENSIVE    Collection Time: 01/12/18  2:54 PM   Result Value Ref Range    Sodium 138 136 - 145 mmol/L    Potassium 3.3 (L) 3.5 - 5.5 mmol/L    Chloride 95 (L) 100 - 108 mmol/L    CO2 28 21 - 32 mmol/L    Anion gap 15 3.0 - 18 mmol/L    Glucose 296 (H) 74 - 99 mg/dL    BUN 19 (H) 7.0 - 18 MG/DL    Creatinine 1.86 (H) 0.6 - 1.3 MG/DL    BUN/Creatinine ratio 10 (L) 12 - 20      GFR est AA 44 (L) >60 ml/min/1.73m2    GFR est non-AA 37 (L) >60 ml/min/1.73m2    Calcium 9.4 8.5 - 10.1 MG/DL    Bilirubin, total 0.7 0.2 - 1.0 MG/DL    ALT (SGPT) 168 (H) 16 - 61 U/L    AST (SGOT) 200 (H) 15 - 37 U/L    Alk. phosphatase 79 45 - 117 U/L    Protein, total 7.8 6.4 - 8.2 g/dL    Albumin 3.9 3.4 - 5.0 g/dL    Globulin 3.9 2.0 - 4.0 g/dL    A-G Ratio 1.0 0.8 - 1.7     CARDIAC PANEL,(CK, CKMB & TROPONIN)    Collection Time: 01/12/18  2:54 PM   Result Value Ref Range    CK 76 39 - 308 U/L    CK - MB 1.2 <3.6 ng/ml    CK-MB Index 1.6 0.0 - 4.0 %    Troponin-I, Qt. 0.02 0.0 - 0.045 NG/ML   MAGNESIUM    Collection Time: 01/12/18  2:54 PM   Result Value Ref Range    Magnesium 1.7 1.6 - 2.6 mg/dL   EKG, 12 LEAD, INITIAL    Collection Time: 01/12/18  3:55 PM   Result Value Ref Range    Ventricular Rate 68 BPM    Atrial Rate 68 BPM    P-R Interval 204 ms    QRS Duration 136 ms    Q-T Interval 602 ms    QTC Calculation (Bezet) 640 ms    Calculated P Axis 78 degrees    Calculated R Axis -132 degrees    Calculated T Axis -8 degrees    Diagnosis       Electronic ventricular pacemaker  No previous ECGs available         Radiologic Studies -   XR CHEST PORT   Impression:     Low lung volumes. Cardiomegaly with no definite evidence of pneumonia or heart  failure at this time. CT SPINE CERV WO CONT   IMPRESSION:     No CT evidence of acute C-spine injury seen.     Mild spondylosis as above. CT HEAD WO CONT   IMPRESSION:     No acute intracranial abnormality. Note: If clinical concern of acute stroke, MRI with diffusion weighted images is  recommended for better evaluation.      Mild contusion at right posterior parietal scalp without skull fracture. Correlate clinically.     Chronic left maxillary sinusitis with air-fluid level and partially calcified  mucous material.            Medical Decision Making   I am the first provider for this patient. I reviewed the vital signs, available nursing notes, past medical history, past surgical history, family history and social history. Vital Signs-Reviewed the patient's vital signs. EKG: Interpreted by the EP.    Time Interpreted: 15:55   Rate: 68 BPM   Rhythm: Paced    Interpretation:    Comparison: no previous for comparison      Provider Notes (Medical Decision Making):   Pt presenting after fall, found to be pulseless on EMS arrival. AICD fired en route, pt arrived requiring support with respirations but airway intact, showing a paced rhythm, confused and required ativan to obtain ct scan however neuro exam continued to improve. On return from ct scan pt speaking, does have some repetitive questioning and no memory of today but reassuring. Per chart review has had V fib arrest in the past, labs and cxr reviewed. Cardiology consulted and appreciate their assistance. Family updated at bedside thorughtout and questions answered. ED Course: Progress Notes, Reevaluation, and Consults:  3:40 PM Consult: I discussed care with Arnulfo Silveira (Cardiology). It was a standard discussion including patient history, chief complaint, available diagnostic results, and predicted treatment course. Evaluated at bedside and has started amiodarone drip. Will follow along in consult  4:22 PM Consult: I discussed care with Dr. Natalie Balbuena  (Hospitalist). It was a standard discussion including patient history, chief complaint, available diagnostic results, and predicted treatment course. Agrees for admission. Critical Care Time:   CRITICAL CARE NOTE:  7:30 PM  I have spent 45 minutes of critical care time involved in lab review, consultations with specialist, family decision-making, and documentation. During this entire length of time I was immediately available to the patient. Critical Care: The reason for providing this level of medical care for this critically ill patient was due a critical illness that impaired one or more vital organ systems such that there was a high probability of imminent or life threatening deterioration in the patients condition.  This care involved high complexity decision making to assess, manipulate, and support vital system functions, to treat this degreee vital organ system failure and to prevent further life threatening deterioration of the patients condition. Diagnosis     Clinical Impression:   1. Cardiac arrest ([address]    2. AICD discharge    3. Fall, initial encounter    4. Contusion of scalp, initial encounter    5. Confusion        Disposition: Admitted    Follow-up Information     None           Patient's Medications    No medications on file     _______________________________    Attestations:  Jenn[address]ib[address]ety [id number]  [id number] acting as a scribe for and in the presence of Brad Hitchcock MD      January 12, 2018 at 2:51 PM       Provider Attestation:      I personally performed the services described in the documentation, reviewed the documentation, as recorded by the scribe in my presence, and it accurately and completely records my words and actions.  January 12, 2018 at 2:51 PM - Brad Hitchcock MD    _______________________________

## 2018-01-12 NOTE — ED NOTES
Linen and gown change pt has emesis bile color with dry heaving, pt needs frequent reorientation every 2 minutes pt is asking same questions

## 2018-01-12 NOTE — CONSULTS
[address] [facility]    Maricarmen Hubbard  MR#: [id number]  : 1950  ACCOUNT #: [de-identified]   DATE OF SERVICE: 2018    REQUESTING PHYSICIAN:  Alan De La Cruz MD in the emergency room. REASON FOR EVALUATION:  Cardiac arrest with ventricular tachycardia. HISTORY OF PRESENT ILLNESS:  The patient is a 72-year-old patient with extensive cardiac history. I have reviewed his prior record. He is currently not able to provide any history. He has a history of ischemic cardiomyopathy with severely reduced ejection fraction. Last echocardiogram exam in 2012 showing 20% ejection fraction with akinesis of apex and anterior segment. He was admitted recently to [facility] with cardiac arrest and was noted to have ventricular tachycardia. He was stabilized and was sent home on amiodarone 200 mg twice a day. Subsequently, he was in the parking lot today, when someone noticed him falling down, hitting his head and unresponsive. He had no pulse at that time. EMS was called, and noted him to be in ventricular tachycardia. He required manual shock to bring him out of V-tach. Subsequently, he did have ICD discharges also. In the emergency room, he is waking up and appears to be in a paced rhythm. PAST MEDICAL HISTORY:  In reviewing his past record, he has a history significant for ventricular tachycardia with ischemic cardiomyopathy; coronary artery disease with prior myocardial infarction and bypass surgery, as well as old stents; paroxysmal atrial fibrillation; hypertension; diabetes; sleep apnea; systolic and diastolic heart failure; gout. SURGICAL HISTORY:  As noted with CABG and ICD placement with BI-V ICD last in , cataract removal and colonoscopy.     MEDICATIONS AT HOME:  On last discharge included Lipitor, Aldactone, Colace, Senokot, magnesium oxide 400 once a day, Coreg 3.125 twice a day, amiodarone 200 mg twice a day, potassium chloride 20 mEq once a day, Demadex 20 mg a day, aspirin, Amaryl, Glucophage Eliquis 5 mg twice a day, Zyloprim, multivitamin, Linzess, Percocet, Nasonex, TobraDex and Flexeril. ALLERGIES: NONE REPORTED. REVIEW OF SYSTEMS:  Unobtainable. FAMILY HISTORY:  Unobtainable from patient. SOCIAL HISTORY:  Unobtainable from patient. PHYSICAL EXAMINATION:  GENERAL:  A drowsy man, slowly waking up, responds to some verbal commands. VITAL SIGNS:  Blood pressure 121/78, pulse 67 and paced, respiratory rate 12. HEENT:  Head normocephalic. Eyes:  No pallor or jaundice. NECK:  In a brace, awaiting CT scan due to trauma. LUNGS:  Clear to auscultation. HEART:  S1, S2  normal.  No clear gallop. Soft systolic murmur at left sternal border. ABDOMEN:  Soft and nontender. EXTREMITIES:  No edema. NEUROLOGICAL:  Obtunded. PSYCHIATRIC:  Difficult to assess. SKIN:  No bruising or rash. DIAGNOSTIC DATA:  EKG post-cardiac arrest shows ventricular paced rhythm. Lab work:  Available labs include hemoglobin of 14, white count 11.9, platelets 204,100. Potassium of 3.3, BUN of 19, creatinine 1.6, which is higher than discharge creatinine of 1.5 on recent admission and baseline creatinine that runs near-normal.  Magnesium 1.7. SGPT of 168, SGOT of 200. Troponin 0.02, CPK-MB negative. ASSESSMENT:  1. Ventricular tachycardia with cardiac arrest, requiring resuscitation with requirement of shocks. Also had ICD discharges en route to the emergency room, subsequently in emergency room, staying in ventricular paced rhythm. 2.  Ischemic cardiomyopathy with severely reduced systolic function and ejection fraction of 20% on echocardiogram in December of 2017. 3.  Coronary artery disease with old myocardial infarction with extensive ischemic cardiomyopathy, and currently initial troponin is negative. Does not appear to be acute coronary syndrome. 4.  History of paroxysmal atrial fibrillation, maintained on Eliquis.   5. Hypokalemia. 6.  Acute renal insufficiency with increased creatinine. 7.  Elevated liver enzymes, etiology unclear. Patient is on amiodarone and statin. Last LFTs in May of 2017 were reported normal.    RECOMMENDATIONS:  Patient is in critical condition. I would start IV amiodarone in view of his recurrent shock. Will likely require more than one medication to control his tachycardia. We will get device interrogated to assess status of antitachycardia pacing. Once stable, restart anticoagulation. Follow serial cardiac enzymes. EP evaluation. I am not sure if there is any feasibility of ventricular tachycardia ablation in this patient in view of his recurrent episode. Monitor closely. Monitor liver enzymes. Hold statin in view of elevated LFTs. Monitor closely on IV amiodarone. Further plans based on clinical course. I have discussed his condition in detail with the family at bedside. Many thanks for allowing us to participate in the care of this patient. Marin TORIBIO / LASHA  D: 01/12/2018 15:46     T: 01/12/2018 16:50  JOB #: [id number]

## 2018-01-13 LAB
ALBUMIN SERPL-MCNC: 3.7 G/DL (ref 3.4–5)
ALBUMIN SERPL-MCNC: 3.7 G/DL (ref 3.4–5)
ALBUMIN/GLOB SERPL: 1.1 {RATIO} (ref 0.8–1.7)
ALBUMIN/GLOB SERPL: 1.3 {RATIO} (ref 0.8–1.7)
ALP SERPL-CCNC: 62 U/L (ref 45–117)
ALP SERPL-CCNC: 64 U/L (ref 45–117)
ALT SERPL-CCNC: 118 U/L (ref 16–61)
ALT SERPL-CCNC: 130 U/L (ref 16–61)
ANION GAP SERPL CALC-SCNC: 12 MMOL/L (ref 3–18)
ANION GAP SERPL CALC-SCNC: 6 MMOL/L (ref 3–18)
APPEARANCE UR: CLEAR
ARTERIAL PATENCY WRIST A: YES
AST SERPL-CCNC: 117 U/L (ref 15–37)
AST SERPL-CCNC: 86 U/L (ref 15–37)
ATRIAL RATE: 68 BPM
ATRIAL RATE: 68 BPM
BACTERIA URNS QL MICRO: NEGATIVE /HPF
BASE EXCESS BLD CALC-SCNC: 11 MMOL/L
BASOPHILS # BLD: 0 K/UL (ref 0–0.1)
BASOPHILS NFR BLD: 0 % (ref 0–2)
BDY SITE: ABNORMAL
BILIRUB SERPL-MCNC: 0.4 MG/DL (ref 0.2–1)
BILIRUB SERPL-MCNC: 0.5 MG/DL (ref 0.2–1)
BILIRUB UR QL: NEGATIVE
BUN SERPL-MCNC: 18 MG/DL (ref 7–18)
BUN SERPL-MCNC: 21 MG/DL (ref 7–18)
BUN/CREAT SERPL: 13 (ref 12–20)
BUN/CREAT SERPL: 15 (ref 12–20)
CA-I BLD-MCNC: 1.22 MMOL/L (ref 1.12–1.32)
CA-I SERPL-SCNC: 1.1 MMOL/L (ref 1.12–1.32)
CALCIUM SERPL-MCNC: 8.7 MG/DL (ref 8.5–10.1)
CALCIUM SERPL-MCNC: 9 MG/DL (ref 8.5–10.1)
CALCULATED P AXIS, ECG09: -16 DEGREES
CALCULATED P AXIS, ECG09: 78 DEGREES
CALCULATED R AXIS, ECG10: -121 DEGREES
CALCULATED R AXIS, ECG10: -132 DEGREES
CALCULATED T AXIS, ECG11: -65 DEGREES
CALCULATED T AXIS, ECG11: -8 DEGREES
CHLORIDE SERPL-SCNC: 96 MMOL/L (ref 100–108)
CHLORIDE SERPL-SCNC: 99 MMOL/L (ref 100–108)
CK MB CFR SERPL CALC: 0.8 % (ref 0–4)
CK MB CFR SERPL CALC: 1.6 % (ref 0–4)
CK MB CFR SERPL CALC: ABNORMAL % (ref 0–4)
CK MB SERPL-MCNC: 1.3 NG/ML (ref 5–25)
CK MB SERPL-MCNC: 2.6 NG/ML (ref 5–25)
CK MB SERPL-MCNC: <1 NG/ML (ref 5–25)
CK SERPL-CCNC: 286 U/L (ref 39–308)
CK SERPL-CCNC: 315 U/L (ref 39–308)
CK SERPL-CCNC: 83 U/L (ref 39–308)
CO2 SERPL-SCNC: 30 MMOL/L (ref 21–32)
CO2 SERPL-SCNC: 33 MMOL/L (ref 21–32)
COLOR UR: YELLOW
CREAT SERPL-MCNC: 1.34 MG/DL (ref 0.6–1.3)
CREAT SERPL-MCNC: 1.39 MG/DL (ref 0.6–1.3)
CREAT UR-MCNC: 146 MG/DL (ref 30–125)
DIAGNOSIS, 93000: NORMAL
DIAGNOSIS, 93000: NORMAL
DIFFERENTIAL METHOD BLD: ABNORMAL
EOSINOPHIL # BLD: 0 K/UL (ref 0–0.4)
EOSINOPHIL NFR BLD: 0 % (ref 0–5)
EPITH CASTS URNS QL MICRO: NEGATIVE /LPF (ref 0–5)
ERYTHROCYTE [DISTWIDTH] IN BLOOD BY AUTOMATED COUNT: 13.5 % (ref 11.6–14.5)
GAS FLOW.O2 O2 DELIVERY SYS: ABNORMAL L/MIN
GAS FLOW.O2 SETTING OXYMISER: 3 L/M
GLOBULIN SER CALC-MCNC: 2.9 G/DL (ref 2–4)
GLOBULIN SER CALC-MCNC: 3.5 G/DL (ref 2–4)
GLUCOSE BLD STRIP.AUTO-MCNC: 140 MG/DL (ref 70–110)
GLUCOSE BLD STRIP.AUTO-MCNC: 147 MG/DL (ref 70–110)
GLUCOSE BLD STRIP.AUTO-MCNC: 177 MG/DL (ref 70–110)
GLUCOSE BLD STRIP.AUTO-MCNC: 180 MG/DL (ref 74–106)
GLUCOSE BLD STRIP.AUTO-MCNC: 233 MG/DL (ref 70–110)
GLUCOSE BLD STRIP.AUTO-MCNC: 297 MG/DL (ref 70–110)
GLUCOSE BLD STRIP.AUTO-MCNC: >600 MG/DL (ref 70–110)
GLUCOSE SERPL-MCNC: 198 MG/DL (ref 74–99)
GLUCOSE SERPL-MCNC: 229 MG/DL (ref 74–99)
GLUCOSE UR STRIP.AUTO-MCNC: 250 MG/DL
HCO3 BLD-SCNC: 34.3 MMOL/L (ref 22–26)
HCT VFR BLD AUTO: 37.8 % (ref 36–48)
HCT VFR BLD CALC: 34 % (ref 36–49)
HGB BLD-MCNC: 11.6 G/DL (ref 12–16)
HGB BLD-MCNC: 12.7 G/DL (ref 13–16)
HGB UR QL STRIP: ABNORMAL
HYALINE CASTS URNS QL MICRO: ABNORMAL /LPF (ref 0–2)
KETONES UR QL STRIP.AUTO: ABNORMAL MG/DL
LEUKOCYTE ESTERASE UR QL STRIP.AUTO: NEGATIVE
LYMPHOCYTES # BLD: 1 K/UL (ref 0.9–3.6)
LYMPHOCYTES NFR BLD: 11 % (ref 21–52)
MAGNESIUM SERPL-MCNC: 1.9 MG/DL (ref 1.6–2.6)
MCH RBC QN AUTO: 30.5 PG (ref 24–34)
MCHC RBC AUTO-ENTMCNC: 33.6 G/DL (ref 31–37)
MCV RBC AUTO: 90.9 FL (ref 74–97)
MONOCYTES # BLD: 0.8 K/UL (ref 0.05–1.2)
MONOCYTES NFR BLD: 9 % (ref 3–10)
MUCOUS THREADS URNS QL MICRO: ABNORMAL /LPF
NEUTS SEG # BLD: 6.9 K/UL (ref 1.8–8)
NEUTS SEG NFR BLD: 80 % (ref 40–73)
NITRITE UR QL STRIP.AUTO: NEGATIVE
O2/TOTAL GAS SETTING VFR VENT: 30 %
P-R INTERVAL, ECG05: 204 MS
P-R INTERVAL, ECG05: 212 MS
PCO2 BLD: 46.2 MMHG (ref 35–45)
PH BLD: 7.48 [PH] (ref 7.35–7.45)
PH UR STRIP: 5 [PH] (ref 5–8)
PLATELET # BLD AUTO: 184 K/UL (ref 135–420)
PMV BLD AUTO: 10.3 FL (ref 9.2–11.8)
PO2 BLD: 160 MMHG (ref 80–100)
POTASSIUM BLD-SCNC: 3.7 MMOL/L (ref 3.5–5.5)
POTASSIUM SERPL-SCNC: 2.6 MMOL/L (ref 3.5–5.5)
POTASSIUM SERPL-SCNC: 3.9 MMOL/L (ref 3.5–5.5)
POTASSIUM UR-SCNC: 56 MMOL/L (ref 12–62)
PROT SERPL-MCNC: 6.6 G/DL (ref 6.4–8.2)
PROT SERPL-MCNC: 7.2 G/DL (ref 6.4–8.2)
PROT UR STRIP-MCNC: NEGATIVE MG/DL
PROT UR-MCNC: 38 MG/DL
PROT/CREAT UR-RTO: 0.3
Q-T INTERVAL, ECG07: 602 MS
Q-T INTERVAL, ECG07: 798 MS
QRS DURATION, ECG06: 136 MS
QRS DURATION, ECG06: 158 MS
QTC CALCULATION (BEZET), ECG08: 640 MS
QTC CALCULATION (BEZET), ECG08: 848 MS
RBC # BLD AUTO: 4.16 M/UL (ref 4.7–5.5)
RBC #/AREA URNS HPF: ABNORMAL /HPF (ref 0–5)
SAO2 % BLD: 100 % (ref 92–97)
SERVICE CMNT-IMP: ABNORMAL
SODIUM BLD-SCNC: 136 MMOL/L (ref 136–145)
SODIUM SERPL-SCNC: 138 MMOL/L (ref 136–145)
SODIUM SERPL-SCNC: 138 MMOL/L (ref 136–145)
SODIUM UR-SCNC: <5 MMOL/L (ref 20–110)
SP GR UR REFRACTOMETRY: 1.02 (ref 1–1.03)
SPECIMEN TYPE: ABNORMAL
TOTAL RESP. RATE, ITRR: 17
TROPONIN I SERPL-MCNC: 0.18 NG/ML (ref 0–0.04)
TROPONIN I SERPL-MCNC: 0.22 NG/ML (ref 0–0.04)
TROPONIN I SERPL-MCNC: 0.47 NG/ML (ref 0–0.04)
UROBILINOGEN UR QL STRIP.AUTO: 0.2 EU/DL (ref 0.2–1)
VENTRICULAR RATE, ECG03: 68 BPM
VENTRICULAR RATE, ECG03: 68 BPM
WBC # BLD AUTO: 8.6 K/UL (ref 4.6–13.2)
WBC URNS QL MICRO: NEGATIVE /HPF (ref 0–4)

## 2018-01-13 PROCEDURE — 74011000250 HC RX REV CODE- 250: Performed by: INTERNAL MEDICINE

## 2018-01-13 PROCEDURE — 85025 COMPLETE CBC W/AUTO DIFF WBC: CPT | Performed by: INTERNAL MEDICINE

## 2018-01-13 PROCEDURE — 36415 COLL VENOUS BLD VENIPUNCTURE: CPT | Performed by: INTERNAL MEDICINE

## 2018-01-13 PROCEDURE — 74011250636 HC RX REV CODE- 250/636

## 2018-01-13 PROCEDURE — 74011250637 HC RX REV CODE- 250/637: Performed by: FAMILY MEDICINE

## 2018-01-13 PROCEDURE — 74011250637 HC RX REV CODE- 250/637: Performed by: INTERNAL MEDICINE

## 2018-01-13 PROCEDURE — 36600 WITHDRAWAL OF ARTERIAL BLOOD: CPT

## 2018-01-13 PROCEDURE — 84300 ASSAY OF URINE SODIUM: CPT | Performed by: INTERNAL MEDICINE

## 2018-01-13 PROCEDURE — 82436 ASSAY OF URINE CHLORIDE: CPT | Performed by: INTERNAL MEDICINE

## 2018-01-13 PROCEDURE — 74011250636 HC RX REV CODE- 250/636: Performed by: INTERNAL MEDICINE

## 2018-01-13 PROCEDURE — 82550 ASSAY OF CK (CPK): CPT | Performed by: INTERNAL MEDICINE

## 2018-01-13 PROCEDURE — 81001 URINALYSIS AUTO W/SCOPE: CPT | Performed by: INTERNAL MEDICINE

## 2018-01-13 PROCEDURE — 82330 ASSAY OF CALCIUM: CPT | Performed by: INTERNAL MEDICINE

## 2018-01-13 PROCEDURE — 80048 BASIC METABOLIC PNL TOTAL CA: CPT | Performed by: INTERNAL MEDICINE

## 2018-01-13 PROCEDURE — 92960 CARDIOVERSION ELECTRIC EXT: CPT

## 2018-01-13 PROCEDURE — 93005 ELECTROCARDIOGRAM TRACING: CPT

## 2018-01-13 PROCEDURE — 74011000250 HC RX REV CODE- 250

## 2018-01-13 PROCEDURE — 65660000004 HC RM CVT STEPDOWN

## 2018-01-13 PROCEDURE — 84156 ASSAY OF PROTEIN URINE: CPT | Performed by: INTERNAL MEDICINE

## 2018-01-13 PROCEDURE — 84133 ASSAY OF URINE POTASSIUM: CPT | Performed by: INTERNAL MEDICINE

## 2018-01-13 PROCEDURE — 80053 COMPREHEN METABOLIC PANEL: CPT | Performed by: INTERNAL MEDICINE

## 2018-01-13 PROCEDURE — 82803 BLOOD GASES ANY COMBINATION: CPT

## 2018-01-13 PROCEDURE — 74011636637 HC RX REV CODE- 636/637: Performed by: INTERNAL MEDICINE

## 2018-01-13 PROCEDURE — 84295 ASSAY OF SERUM SODIUM: CPT

## 2018-01-13 PROCEDURE — 74011250637 HC RX REV CODE- 250/637: Performed by: PHYSICIAN ASSISTANT

## 2018-01-13 PROCEDURE — 77030011256 HC DRSG MEPILEX <16IN NO BORD MOLN -A

## 2018-01-13 PROCEDURE — 74011250636 HC RX REV CODE- 250/636: Performed by: FAMILY MEDICINE

## 2018-01-13 PROCEDURE — 82962 GLUCOSE BLOOD TEST: CPT

## 2018-01-13 PROCEDURE — 83735 ASSAY OF MAGNESIUM: CPT | Performed by: INTERNAL MEDICINE

## 2018-01-13 RX ORDER — POTASSIUM CHLORIDE 14.9 MG/ML
INJECTION INTRAVENOUS
Status: COMPLETED
Start: 2018-01-13 | End: 2018-01-13

## 2018-01-13 RX ORDER — MAGNESIUM SULFATE HEPTAHYDRATE 40 MG/ML
2 INJECTION, SOLUTION INTRAVENOUS
Status: COMPLETED | OUTPATIENT
Start: 2018-01-13 | End: 2018-01-13

## 2018-01-13 RX ORDER — POTASSIUM CHLORIDE 20 MEQ/1
40 TABLET, EXTENDED RELEASE ORAL EVERY 4 HOURS
Status: DISCONTINUED | OUTPATIENT
Start: 2018-01-13 | End: 2018-01-13

## 2018-01-13 RX ORDER — CARVEDILOL 3.12 MG/1
3.12 TABLET ORAL 2 TIMES DAILY WITH MEALS
Status: DISCONTINUED | OUTPATIENT
Start: 2018-01-13 | End: 2018-01-16 | Stop reason: HOSPADM

## 2018-01-13 RX ORDER — SENNOSIDES 8.6 MG/1
1 TABLET ORAL
Status: DISCONTINUED | OUTPATIENT
Start: 2018-01-13 | End: 2018-01-16 | Stop reason: HOSPADM

## 2018-01-13 RX ORDER — LIDOCAINE HYDROCHLORIDE ANHYDROUS AND DEXTROSE MONOHYDRATE .4; 5 G/100ML; G/100ML
3 INJECTION, SOLUTION INTRAVENOUS
Status: DISCONTINUED | OUTPATIENT
Start: 2018-01-13 | End: 2018-01-13

## 2018-01-13 RX ORDER — POTASSIUM CHLORIDE 7.45 MG/ML
INJECTION INTRAVENOUS
Status: COMPLETED
Start: 2018-01-13 | End: 2018-01-13

## 2018-01-13 RX ORDER — LIDOCAINE HYDROCHLORIDE ANHYDROUS AND DEXTROSE MONOHYDRATE .4; 5 G/100ML; G/100ML
INJECTION, SOLUTION INTRAVENOUS
Status: COMPLETED
Start: 2018-01-13 | End: 2018-01-13

## 2018-01-13 RX ORDER — LIDOCAINE HYDROCHLORIDE 5 MG/ML
1 INJECTION, SOLUTION INFILTRATION; PERINEURAL ONCE
Status: COMPLETED | OUTPATIENT
Start: 2018-01-13 | End: 2018-01-13

## 2018-01-13 RX ORDER — MIDAZOLAM HYDROCHLORIDE 1 MG/ML
INJECTION, SOLUTION INTRAMUSCULAR; INTRAVENOUS
Status: COMPLETED
Start: 2018-01-13 | End: 2018-01-13

## 2018-01-13 RX ORDER — POTASSIUM IODIDE 1 G/ML
1 SOLUTION ORAL
Status: DISCONTINUED | OUTPATIENT
Start: 2018-01-13 | End: 2018-01-13

## 2018-01-13 RX ORDER — POTASSIUM CHLORIDE 7.45 MG/ML
10 INJECTION INTRAVENOUS
Status: COMPLETED | OUTPATIENT
Start: 2018-01-13 | End: 2018-01-13

## 2018-01-13 RX ORDER — MAGNESIUM SULFATE HEPTAHYDRATE 40 MG/ML
INJECTION, SOLUTION INTRAVENOUS
Status: COMPLETED
Start: 2018-01-13 | End: 2018-01-13

## 2018-01-13 RX ORDER — MIDAZOLAM HYDROCHLORIDE 1 MG/ML
1 INJECTION, SOLUTION INTRAMUSCULAR; INTRAVENOUS ONCE
Status: COMPLETED | OUTPATIENT
Start: 2018-01-13 | End: 2018-01-13

## 2018-01-13 RX ORDER — CYCLOBENZAPRINE HCL 10 MG
5 TABLET ORAL ONCE
Status: COMPLETED | OUTPATIENT
Start: 2018-01-13 | End: 2018-01-13

## 2018-01-13 RX ORDER — FACIAL-BODY WIPES
10 EACH TOPICAL DAILY PRN
Status: DISCONTINUED | OUTPATIENT
Start: 2018-01-13 | End: 2018-01-16 | Stop reason: HOSPADM

## 2018-01-13 RX ORDER — POTASSIUM CHLORIDE 1.5 G/1.77G
40 POWDER, FOR SOLUTION ORAL
Status: COMPLETED | OUTPATIENT
Start: 2018-01-13 | End: 2018-01-13

## 2018-01-13 RX ORDER — ALLOPURINOL 100 MG/1
200 TABLET ORAL DAILY
Status: DISCONTINUED | OUTPATIENT
Start: 2018-01-14 | End: 2018-01-16 | Stop reason: HOSPADM

## 2018-01-13 RX ORDER — ATORVASTATIN CALCIUM 20 MG/1
20 TABLET, FILM COATED ORAL
Status: DISCONTINUED | OUTPATIENT
Start: 2018-01-13 | End: 2018-01-13

## 2018-01-13 RX ORDER — LIDOCAINE HCL/PF 100 MG/5ML
SYRINGE (ML) INTRAVENOUS
Status: COMPLETED
Start: 2018-01-13 | End: 2018-01-13

## 2018-01-13 RX ORDER — POTASSIUM CHLORIDE 20 MEQ/1
20 TABLET, EXTENDED RELEASE ORAL DAILY
Status: DISCONTINUED | OUTPATIENT
Start: 2018-01-13 | End: 2018-01-16 | Stop reason: HOSPADM

## 2018-01-13 RX ADMIN — ONDANSETRON 4 MG: 2 INJECTION INTRAMUSCULAR; INTRAVENOUS at 01:24

## 2018-01-13 RX ADMIN — FAMOTIDINE 20 MG: 10 INJECTION INTRAVENOUS at 20:45

## 2018-01-13 RX ADMIN — POTASSIUM CHLORIDE 10 MEQ: 10 INJECTION, SOLUTION INTRAVENOUS at 11:39

## 2018-01-13 RX ADMIN — AMIODARONE HYDROCHLORIDE 1 MG/MIN: 1.8 INJECTION, SOLUTION INTRAVENOUS at 01:24

## 2018-01-13 RX ADMIN — POTASSIUM CHLORIDE 10 MEQ: 14.9 INJECTION, SOLUTION INTRAVENOUS at 07:00

## 2018-01-13 RX ADMIN — MIDAZOLAM HYDROCHLORIDE 1 MG: 1 INJECTION, SOLUTION INTRAMUSCULAR; INTRAVENOUS at 08:00

## 2018-01-13 RX ADMIN — ONDANSETRON 4 MG: 2 INJECTION INTRAMUSCULAR; INTRAVENOUS at 14:25

## 2018-01-13 RX ADMIN — FAMOTIDINE 20 MG: 10 INJECTION INTRAVENOUS at 08:09

## 2018-01-13 RX ADMIN — AMIODARONE HYDROCHLORIDE 1 MG/MIN: 1.8 INJECTION, SOLUTION INTRAVENOUS at 08:22

## 2018-01-13 RX ADMIN — MAGNESIUM SULFATE HEPTAHYDRATE 2 G: 40 INJECTION, SOLUTION INTRAVENOUS at 08:00

## 2018-01-13 RX ADMIN — POTASSIUM CHLORIDE 10 MEQ: 7.46 INJECTION, SOLUTION INTRAVENOUS at 08:00

## 2018-01-13 RX ADMIN — LIDOCAINE HYDROCHLORIDE 84.7 MG: 5 INJECTION, SOLUTION INFILTRATION; PERINEURAL at 06:45

## 2018-01-13 RX ADMIN — BISACODYL 10 MG: 10 SUPPOSITORY RECTAL at 20:55

## 2018-01-13 RX ADMIN — POTASSIUM CHLORIDE 10 MEQ: 7.46 INJECTION, SOLUTION INTRAVENOUS at 10:00

## 2018-01-13 RX ADMIN — CYCLOBENZAPRINE HYDROCHLORIDE 5 MG: 10 TABLET, FILM COATED ORAL at 20:46

## 2018-01-13 RX ADMIN — POTASSIUM CHLORIDE 10 MEQ: 7.46 INJECTION, SOLUTION INTRAVENOUS at 09:00

## 2018-01-13 RX ADMIN — INSULIN LISPRO 10 UNITS: 100 INJECTION, SOLUTION INTRAVENOUS; SUBCUTANEOUS at 07:30

## 2018-01-13 RX ADMIN — HEPARIN SODIUM 5000 UNITS: 5000 INJECTION, SOLUTION INTRAVENOUS; SUBCUTANEOUS at 00:37

## 2018-01-13 RX ADMIN — HEPARIN SODIUM 5000 UNITS: 5000 INJECTION, SOLUTION INTRAVENOUS; SUBCUTANEOUS at 15:34

## 2018-01-13 RX ADMIN — MIDAZOLAM HYDROCHLORIDE 2 MG: 1 INJECTION, SOLUTION INTRAMUSCULAR; INTRAVENOUS at 08:18

## 2018-01-13 RX ADMIN — POTASSIUM CHLORIDE 10 MEQ: 10 INJECTION, SOLUTION INTRAVENOUS at 06:17

## 2018-01-13 RX ADMIN — POTASSIUM CHLORIDE 40 MEQ: 1.5 POWDER, FOR SOLUTION ORAL at 07:00

## 2018-01-13 RX ADMIN — INSULIN LISPRO 6 UNITS: 100 INJECTION, SOLUTION INTRAVENOUS; SUBCUTANEOUS at 00:38

## 2018-01-13 RX ADMIN — Medication 10 ML: at 15:30

## 2018-01-13 RX ADMIN — LIDOCAINE HYDROCHLORIDE 85 MG: 20 INJECTION INTRAVENOUS at 06:27

## 2018-01-13 RX ADMIN — Medication 10 ML: at 21:02

## 2018-01-13 RX ADMIN — Medication 10 ML: at 05:25

## 2018-01-13 RX ADMIN — POTASSIUM CHLORIDE 10 MEQ: 7.46 INJECTION, SOLUTION INTRAVENOUS at 06:17

## 2018-01-13 RX ADMIN — SENNOSIDES 8.6 MG: 8.6 TABLET, FILM COATED ORAL at 18:07

## 2018-01-13 RX ADMIN — LIDOCAINE HYDROCHLORIDE 4 MG/MIN: 400 INJECTION, SOLUTION INTRAVENOUS at 07:00

## 2018-01-13 RX ADMIN — FAMOTIDINE 20 MG: 10 INJECTION INTRAVENOUS at 00:41

## 2018-01-13 RX ADMIN — POTASSIUM CHLORIDE 10 MEQ: 10 INJECTION, SOLUTION INTRAVENOUS at 10:57

## 2018-01-13 RX ADMIN — MAGNESIUM SULFATE IN WATER 2 G: 40 INJECTION, SOLUTION INTRAVENOUS at 08:00

## 2018-01-13 RX ADMIN — POTASSIUM CHLORIDE 40 MEQ: 20 TABLET, EXTENDED RELEASE ORAL at 11:39

## 2018-01-13 RX ADMIN — CARVEDILOL 3.12 MG: 3.12 TABLET, FILM COATED ORAL at 17:43

## 2018-01-13 RX ADMIN — POTASSIUM CHLORIDE 20 MEQ: 20 TABLET, EXTENDED RELEASE ORAL at 15:34

## 2018-01-13 RX ADMIN — LIDOCAINE HYDROCHLORIDE ANHYDROUS AND DEXTROSE MONOHYDRATE 4 MG/MIN: .4; 5 INJECTION, SOLUTION INTRAVENOUS at 07:00

## 2018-01-13 RX ADMIN — HEPARIN SODIUM 5000 UNITS: 5000 INJECTION, SOLUTION INTRAVENOUS; SUBCUTANEOUS at 08:14

## 2018-01-13 RX ADMIN — AMIODARONE HYDROCHLORIDE 150 MG: 1.5 INJECTION, SOLUTION INTRAVENOUS at 07:00

## 2018-01-13 NOTE — ROUTINE PROCESS
0120: received pt from ED. Report received from [facility]. Patient VSS.  0200: Informed TAHIRA Garcia, of pt having increased troponin level of 0.22, up from 0.02. Ordered to recheck Troponin level in six hours. 0330: Informed Radha Guerrero NP, of pt having to be defibrillated. Patient went into V-Tach in the 120-160s and was unresponsive. ACLS protocol initiated. AICD did not go off, manual defibrillation required once at 200 joules resulting in conversion to NSR. Patient now responsive and able to answer orientation questions but unaware of event. Patient remains on monitor. Lab and phlebotomy called in order to obtain AM labs and cardiac enzymes now. No new orders given. 0530: Notified Radha Guerrero NP, of pt lab results. Potassium 2.6 and Troponin 0.47. Ordered to place potassium protocol and replace. 56: Notified Radha Guerrero NP, of having to debrillate pt for a second and third time, per ACLS protocol, after pt went into V-Tach 120-160s and unresponsive.  0606: Rapid Response paged overhead. Due to pt being defibrillated, per ACLS protocol, 3 additional times with pt being unresponsive and in V-TACH 120-160. MD came to bedside to assess pt. Multiple medication orders given, see MAR for details. 5762: cardioverted pt after giving 2mg IV Versed due to persistent V-TACH in the 120-140's per MD order with MD at bedside. Patient converted to NSR.  0730: Bedside and Verbal shift change report given to MITCHEL Whitaker, and Suzi Flores RN. Report included the following information SBAR, Kardex, MAR and Cardiac Rhythm NSR.
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
## 2018-01-13 NOTE — ED NOTES
Bedside shift change report given to jeimy foley  (oncoming nurse) by Ness Love  (offgoing nurse). Report included the following information SBAR, ED Summary, Intake/Output, MAR, Recent Results and Cardiac Rhythm sr .

## 2018-01-13 NOTE — PROGRESS NOTES
0759  V-Tach rate 146's on monitor, Versed 2mg IV given. Cardioverted with 200J successfully to NSR 60's with Dr. Kym Case. at bedside. 1048  Critical lab values reported to Dr. Gayle Doherty,  Replacement order in place.

## 2018-01-13 NOTE — PROGRESS NOTES
[facility]      CARDIOLOGY PROGRESS NOTE  RECS:  1. Ventricular tachycardia with cardiac arrest, requiring resuscitation with requirement of shocks. Recurrent Vtach due to profound hypokalemia which is now being replaced. He was started on amiodarone and lidocaine. Will start heparin drip. 2.  Ischemic cardiomyopathy with severely reduced systolic function and ejection fraction of 20% on echocardiogram in December of 2017. 3.  Coronary artery disease with old myocardial infarction with extensive ischemic cardiomyopathy- denies chest pain4. 4.  History of paroxysmal atrial fibrillation- will resume eliquis once patient stable. 5.  Hypokalemia. 6.  Acute renal insufficiency with increased creatinine- improving  7. Elevated liver enzymes, etiology unclear. Patient is on amiodarone and statin. Last LFTs in May of 2017 were reported normal.      ASSESSMENT:  Hospital Problems  Never Reviewed          Codes Class Noted POA    Cardiac arrest [facility]) ICD-10-CM: I46.9  ICD-9-CM: 427.5  1/12/2018 Unknown        Contusion ICD-10-CM: T14. 8XXA  ICD-9-CM: 924.9  1/12/2018 Unknown                SUBJECTIVE:  No CP or SOB    OBJECTIVE:    VS:   Visit Vitals    /63    Pulse 60    Temp 97.7 °F (36.5 °C)    Resp 12    Ht 6' 2\" (1.88 m)    Wt 84.7 kg (186 lb 11.7 oz)    SpO2 100%    BMI 23.97 kg/m2         Intake/Output Summary (Last 24 hours) at 01/13/18 1715  Last data filed at 01/13/18 0400   Gross per 24 hour   Intake            233.2 ml   Output              175 ml   Net             58.2 ml     TELE: normal sinus rhythm    General: in no apparent distress  HENT: Normocephalic, atraumatic. Normal external eye.   Neck :  negative  Cardiac:  regular rate and rhythm, S1, S2 normal, no murmur, click, rub or gallop  Lungs: clear to auscultation bilaterally  Abdomen: Soft, nontender, no masses  Extremities:  No edema      Labs: Results:       Chemistry Recent Labs      01/13/18   3006 01/12/18   1454   GLU  229*  296*   NA  138  138   K  2.6*  3.3*   CL  96*  95*   CO2  30  28   BUN  21*  19*   CREA  1.39*  1.86*   CA  9.0  9.4   AGAP  12  15   BUCR  15  10*   AP  64  79   TP  7.2  7.8   ALB  3.7  3.9   GLOB  3.5  3.9   AGRAT  1.1  1.0      CBC w/Diff Recent Labs      01/13/18   0430  01/12/18   1454   WBC  8.6  11.9   RBC  4.16*  4.55*   HGB  12.7*  14.0   HCT  37.8  41.8   PLT  184  249   GRANS  80*  42   LYMPH  11*  51   EOS  0  2      Cardiac Enzymes Recent Labs      01/13/18   0430  01/13/18   0045   CPK  315*  83   CKND1  0.8  1.6      Coagulation No results for input(s): PTP, INR, APTT in the last 72 hours. No lab exists for component: INREXT    Lipid Panel No results found for: CHOL, CHOLPOCT, CHOLX, CHLST, CHOLV, 331796, HDL, LDL, LDLC, DLDLP, 511499, VLDLC, VLDL, TGLX, TRIGL, TRIGP, TGLPOCT, CHHD, CHHDX   BNP No results for input(s): BNPP in the last 72 hours.    Liver Enzymes Recent Labs      01/13/18   0430   TP  7.2   ALB  3.7   AP  64   SGOT  117*      Thyroid Studies No results found for: T4, T3U, TSH, TSHEXT           Dg Saxena MD

## 2018-01-13 NOTE — PROGRESS NOTES
ABG is consisted with  Metabolic alkalosis, K is 3.7, Ionized ca is 1.22. Will give K supplement  2o meq po daily, check  Mark & Renin.

## 2018-01-13 NOTE — ED NOTES
Spoke to Dr. Carmelo Marroquin about pt. Clarified that he should be placed in CVT- ICU do to extensive cardiac issues. Nursing sup notified.

## 2018-01-13 NOTE — ED NOTES
Family (sister at bedside) and pt anxious about pt not being able to have ice. I reminded pt and family that pt vomited last time he was given any ice.  Swabs provided

## 2018-01-13 NOTE — ED NOTES
Amino gtt started at 1mg/min 33ml/hr. Pt and family aware of purpose of gtt and that staff would be watching HR and BP closely.

## 2018-01-13 NOTE — ED NOTES
Assumed care of pt from Venita, pt in bed on monitor. Pt is confused to recent memories and needs continual reorientation. Oriented x 4 except today's memories. Pt's brother at bedside - very help and cooperative with pt care and reorientations. Pt's primary concern is to get ice-chips, family aware that he cannot have anything.

## 2018-01-13 NOTE — PROGRESS NOTES
Rapid Response Note  [facility]    Patient: Ross Burks 74 y.o. male  [id number]  1950      Admit Date: 1/12/2018   Admission Diagnosis: Cardiac arrest ([address]  Contusion    RAPID RESPONSE     Rapid response called for chest pain and irregular rhythm. The patient was found to be unresponsive and on Vtach so he was given several rounds of shocks. By the time the RRT team came to evaluate the patient, he was responsive and was complaining of chest and abdominal pain. He was noted to have potassium of 2.6 but was unable to tolerate PO K. His EKG was noted to have ventricular paced rhythm with occasional PVC on EKG. Amio ggt increased to 1 from 0.5  1mg/kg bolus lidocaine given and started on ggt @ 4  Started on 10mEq K IV via peripheral  Patient then reverted to Monomorphic Vtach but hemodynamically stable and conversant. 250 Amio bolus given  40mEq K PO given  Patient BP started to drop to SBP 60s so he was given 2mg Versed and was cardioverted. He rhythm returned to NSR afterwards  Cardiology present by this time. Medications Reviewed    ROS Per HPI    OBJECTIVE     Visit Vitals    /61    Pulse (!) 144    Temp 98.8 °F (37.1 °C)    Resp 13    Ht 6' 2\" (1.88 m)    Wt 84.7 kg (186 lb 11.7 oz)    SpO2 100%    BMI 23.97 kg/m2       Physical Exam   Constitutional: He appears well-developed. He appears distressed. HENT:   Head: Normocephalic and atraumatic. Eyes: EOM are normal. Pupils are equal, round, and reactive to light. No scleral icterus. Neck: No tracheal deviation present. Cardiovascular:   No murmur heard. irregularly irregular   Pulmonary/Chest: Effort normal and breath sounds normal. No respiratory distress. Abdominal: Soft. Musculoskeletal: Normal range of motion. Neurological: He is alert. Skin: Skin is warm. He is not diaphoretic.        Medications administered: Per HPI    EKG: Ventricular paced rhythm    Labs: AM labs already drawn    ASSESSMENT, Jesús Turner is a 74y.o. year old male admitted for Cardiac arrest ([facility] [address]  Contusion. Rapid response called for irregular rhythm. Patient condition currently: stable. Per HPI. Patient returned to NSR after cardioversion. Patient has increasing troponins from 0.02 to 0.47. Irregular beats likely 2/2 cardiac ischemia and/or hypokalemia. Will need K repletion  Rhythm control per cardiology    Disposition: Cardiac ICU    Attending Dr. Abimael Villarreal notified of rapid response and was present at the end. In agreement with plan. Cardiology resuming care. Lm Rand In [facility]  PGY-1 [address] [facility]  01/13/18 7:02 AM

## 2018-01-13 NOTE — PROGRESS NOTES
Acute Hypokalemia. Most likely Medicine induced,was on Diuretic at home with K supplement Blood sugar > 76322  Is lab error, now accu  is 177, received Insulin  This morning, more over had  Several episiode  Of vomiting last night. Received several boluses oral & IV K replacement. .Total  Of 120 me is given since this morning    Plan : Urine K, still not voided, ABG, Repeat BMP  Before any supplement of K. No UOP for a while in  a patient with poor renal status. Also ca was Low not sure if it is true or not, check ionized Ca. Will follow.

## 2018-01-14 ENCOUNTER — APPOINTMENT (OUTPATIENT)
Dept: CT IMAGING | Age: 68
DRG: 308 | End: 2018-01-14
Attending: PHYSICIAN ASSISTANT
Payer: MEDICARE

## 2018-01-14 PROBLEM — E11.9 DM TYPE 2 (DIABETES MELLITUS, TYPE 2) (HCC): Status: ACTIVE | Noted: 2018-01-14

## 2018-01-14 PROBLEM — E87.6 HYPOKALEMIA: Status: ACTIVE | Noted: 2018-01-14

## 2018-01-14 PROBLEM — I51.9 SYSTOLIC DYSFUNCTION: Status: ACTIVE | Noted: 2018-01-14

## 2018-01-14 PROBLEM — E87.3 METABOLIC ALKALOSIS: Status: ACTIVE | Noted: 2018-01-14

## 2018-01-14 LAB
ANION GAP SERPL CALC-SCNC: 4 MMOL/L (ref 3–18)
BASOPHILS # BLD: 0 K/UL (ref 0–0.1)
BASOPHILS NFR BLD: 0 % (ref 0–2)
BUN SERPL-MCNC: 16 MG/DL (ref 7–18)
BUN/CREAT SERPL: 13 (ref 12–20)
CALCIUM SERPL-MCNC: 9.1 MG/DL (ref 8.5–10.1)
CHLORIDE SERPL-SCNC: 99 MMOL/L (ref 100–108)
CHLORIDE UR-SCNC: <10 MMOL/L (ref 55–125)
CO2 SERPL-SCNC: 35 MMOL/L (ref 21–32)
CREAT SERPL-MCNC: 1.2 MG/DL (ref 0.6–1.3)
DIFFERENTIAL METHOD BLD: ABNORMAL
EOSINOPHIL # BLD: 0 K/UL (ref 0–0.4)
EOSINOPHIL NFR BLD: 0 % (ref 0–5)
ERYTHROCYTE [DISTWIDTH] IN BLOOD BY AUTOMATED COUNT: 13.6 % (ref 11.6–14.5)
GLUCOSE BLD STRIP.AUTO-MCNC: 155 MG/DL (ref 70–110)
GLUCOSE BLD STRIP.AUTO-MCNC: 174 MG/DL (ref 70–110)
GLUCOSE BLD STRIP.AUTO-MCNC: 195 MG/DL (ref 70–110)
GLUCOSE BLD STRIP.AUTO-MCNC: 250 MG/DL (ref 70–110)
GLUCOSE SERPL-MCNC: 145 MG/DL (ref 74–99)
HBA1C MFR BLD: 6.6 % (ref 4.2–5.6)
HCT VFR BLD AUTO: 37.5 % (ref 36–48)
HGB BLD-MCNC: 12.3 G/DL (ref 13–16)
LYMPHOCYTES # BLD: 2.4 K/UL (ref 0.9–3.6)
LYMPHOCYTES NFR BLD: 27 % (ref 21–52)
MCH RBC QN AUTO: 30.5 PG (ref 24–34)
MCHC RBC AUTO-ENTMCNC: 32.8 G/DL (ref 31–37)
MCV RBC AUTO: 93.1 FL (ref 74–97)
MONOCYTES # BLD: 0.7 K/UL (ref 0.05–1.2)
MONOCYTES NFR BLD: 7 % (ref 3–10)
NEUTS SEG # BLD: 6 K/UL (ref 1.8–8)
NEUTS SEG NFR BLD: 66 % (ref 40–73)
PLATELET # BLD AUTO: 175 K/UL (ref 135–420)
PMV BLD AUTO: 10.3 FL (ref 9.2–11.8)
POTASSIUM SERPL-SCNC: 4.1 MMOL/L (ref 3.5–5.5)
RBC # BLD AUTO: 4.03 M/UL (ref 4.7–5.5)
SODIUM SERPL-SCNC: 138 MMOL/L (ref 136–145)
WBC # BLD AUTO: 9.1 K/UL (ref 4.6–13.2)

## 2018-01-14 PROCEDURE — 80048 BASIC METABOLIC PNL TOTAL CA: CPT | Performed by: INTERNAL MEDICINE

## 2018-01-14 PROCEDURE — 82962 GLUCOSE BLOOD TEST: CPT

## 2018-01-14 PROCEDURE — 82088 ASSAY OF ALDOSTERONE: CPT | Performed by: INTERNAL MEDICINE

## 2018-01-14 PROCEDURE — 85025 COMPLETE CBC W/AUTO DIFF WBC: CPT | Performed by: INTERNAL MEDICINE

## 2018-01-14 PROCEDURE — 74011250637 HC RX REV CODE- 250/637: Performed by: PHYSICIAN ASSISTANT

## 2018-01-14 PROCEDURE — 74011250637 HC RX REV CODE- 250/637: Performed by: INTERNAL MEDICINE

## 2018-01-14 PROCEDURE — 74011250636 HC RX REV CODE- 250/636: Performed by: INTERNAL MEDICINE

## 2018-01-14 PROCEDURE — 83036 HEMOGLOBIN GLYCOSYLATED A1C: CPT | Performed by: INTERNAL MEDICINE

## 2018-01-14 PROCEDURE — 71250 CT THORAX DX C-: CPT

## 2018-01-14 PROCEDURE — 74011000250 HC RX REV CODE- 250: Performed by: INTERNAL MEDICINE

## 2018-01-14 PROCEDURE — 65660000004 HC RM CVT STEPDOWN

## 2018-01-14 PROCEDURE — 36415 COLL VENOUS BLD VENIPUNCTURE: CPT | Performed by: INTERNAL MEDICINE

## 2018-01-14 RX ORDER — GUAIFENESIN 100 MG/5ML
81 LIQUID (ML) ORAL DAILY
Status: DISCONTINUED | OUTPATIENT
Start: 2018-01-14 | End: 2018-01-16 | Stop reason: HOSPADM

## 2018-01-14 RX ORDER — SPIRONOLACTONE 25 MG/1
25 TABLET ORAL DAILY
Status: DISCONTINUED | OUTPATIENT
Start: 2018-01-15 | End: 2018-01-16 | Stop reason: HOSPADM

## 2018-01-14 RX ORDER — LANOLIN ALCOHOL/MO/W.PET/CERES
400 CREAM (GRAM) TOPICAL 2 TIMES DAILY
Status: DISCONTINUED | OUTPATIENT
Start: 2018-01-14 | End: 2018-01-16 | Stop reason: HOSPADM

## 2018-01-14 RX ORDER — AMIODARONE HYDROCHLORIDE 200 MG/1
200 TABLET ORAL 2 TIMES DAILY
Status: DISCONTINUED | OUTPATIENT
Start: 2018-01-14 | End: 2018-01-15

## 2018-01-14 RX ADMIN — ASPIRIN 81 MG 81 MG: 81 TABLET ORAL at 09:27

## 2018-01-14 RX ADMIN — HEPARIN SODIUM 5000 UNITS: 5000 INJECTION, SOLUTION INTRAVENOUS; SUBCUTANEOUS at 01:05

## 2018-01-14 RX ADMIN — Medication 10 ML: at 06:00

## 2018-01-14 RX ADMIN — Medication 400 MG: at 18:03

## 2018-01-14 RX ADMIN — Medication 10 ML: at 21:22

## 2018-01-14 RX ADMIN — CARVEDILOL 3.12 MG: 3.12 TABLET, FILM COATED ORAL at 08:07

## 2018-01-14 RX ADMIN — HEPARIN SODIUM 5000 UNITS: 5000 INJECTION, SOLUTION INTRAVENOUS; SUBCUTANEOUS at 15:52

## 2018-01-14 RX ADMIN — POTASSIUM CHLORIDE 20 MEQ: 20 TABLET, EXTENDED RELEASE ORAL at 08:07

## 2018-01-14 RX ADMIN — FAMOTIDINE 20 MG: 10 INJECTION INTRAVENOUS at 08:07

## 2018-01-14 RX ADMIN — Medication 10 ML: at 15:50

## 2018-01-14 RX ADMIN — AMIODARONE HYDROCHLORIDE 200 MG: 200 TABLET ORAL at 09:27

## 2018-01-14 RX ADMIN — FAMOTIDINE 20 MG: 10 INJECTION INTRAVENOUS at 21:21

## 2018-01-14 RX ADMIN — APIXABAN 5 MG: 5 TABLET, FILM COATED ORAL at 18:03

## 2018-01-14 RX ADMIN — CARVEDILOL 3.12 MG: 3.12 TABLET, FILM COATED ORAL at 16:04

## 2018-01-14 RX ADMIN — AMIODARONE HYDROCHLORIDE 200 MG: 200 TABLET ORAL at 17:30

## 2018-01-14 RX ADMIN — HEPARIN SODIUM 5000 UNITS: 5000 INJECTION, SOLUTION INTRAVENOUS; SUBCUTANEOUS at 08:07

## 2018-01-14 RX ADMIN — AMIODARONE HYDROCHLORIDE 0.5 MG/MIN: 1.8 INJECTION, SOLUTION INTRAVENOUS at 04:14

## 2018-01-14 RX ADMIN — ALLOPURINOL 200 MG: 100 TABLET ORAL at 08:07

## 2018-01-14 NOTE — PROGRESS NOTES
Progress Note    Alicja Macias  74 y.o. Admit Date: 1/12/2018  Active Problems:    Cardiac arrest ([facility].) (1/12/2018) POA: Unknown      Contusion (1/12/2018) POA: Unknown      Hypokalemia (1/14/2018) POA: Unknown      Metabolic alkalosis (3/14/9387) POA: Clinically Undetermined      DM type 2 (diabetes mellitus, type 2) ([facility].) (1/14/2018) POA: Unknown      Systolic dysfunction (4/92/1283) POA: Unknown            Subjective:     Patient feels good, out of ICU to  A Step down bed. No more shock, can not remember what happened to him. A comprehensive review of systems was negative except for that written in the History of Present Illness.     Objective:     Visit Vitals    /69    Pulse 60    Temp 97.7 °F (36.5 °C)    Resp 14    Ht 6' 2\" (1.88 m)    Wt 86.6 kg (190 lb 14.7 oz)    SpO2 91%    BMI 24.51 kg/m2         Intake/Output Summary (Last 24 hours) at 01/14/18 1343  Last data filed at 01/13/18 1805   Gross per 24 hour   Intake          1001.58 ml   Output              200 ml   Net           801.58 ml       Current Facility-Administered Medications   Medication Dose Route Frequency Provider Last Rate Last Dose    amiodarone (CORDARONE) tablet 200 mg  200 mg Oral BID Sanaz Ghotra MD   200 mg at 01/14/18 8168    aspirin chewable tablet 81 mg  81 mg Oral DAILY Sanaz Ghotra MD   81 mg at 01/14/18 0927    [START ON 1/15/2018] spironolactone (ALDACTONE) tablet 25 mg  25 mg Oral DAILY Arnaldo Downing MD        magnesium oxide (MAG-OX) tablet 400 mg  400 mg Oral BID Arnaldo Downing MD        ELECTROLYTE REPLACEMENT PROTOCOL  1 Each Other PRN Kerri Garcia NP        potassium chloride (K-DUR, KLOR-CON) SR tablet 20 mEq  20 mEq Oral DAILY Arnaldo Downing MD   20 mEq at 01/14/18 0807    senna (SENOKOT) tablet 8.6 mg  1 Tab Oral BID PRN Alecia Orourke PA-C   8.6 mg at 01/13/18 1807    allopurinol (ZYLOPRIM) tablet 200 mg  200 mg Oral DAILY Olashleyola Ilogho, PA-C   200 mg at 01/14/18 3183    carvedilol (COREG) tablet 3.125 mg  3.125 mg Oral BID WITH MEALS Alecia Orourke PA-C   3.125 mg at 01/14/18 0916    bisacodyl (DULCOLAX) suppository 10 mg  10 mg Rectal DAILY PRN Sean Ferrara MD   10 mg at 01/13/18 2055    sodium chloride (NS) flush 5-10 mL  5-10 mL IntraVENous Q8H Jennyfer Linares MD   10 mL at 01/14/18 0600    sodium chloride (NS) flush 5-10 mL  5-10 mL IntraVENous PRN Thea Kaba MD        famotidine (PF) (PEPCID) 20 mg in sodium chloride 0.9 % 10 mL injection  20 mg IntraVENous Q12H Thea Kaba MD   20 mg at 01/14/18 0807    acetaminophen (TYLENOL) tablet 650 mg  650 mg Oral Q6H PRN Thea Kaba MD        ondansetron [facility]) injection 4 mg  4 mg IntraVENous Q6H PRN Thea Kaba MD   4 mg at 01/13/18 1425    heparin (porcine) injection 5,000 Units  5,000 Units SubCUTAneous Q8H Thea Kaba MD   5,000 Units at 01/14/18 4667    insulin lispro (HUMALOG) injection   SubCUTAneous AC&HS Thea Kaba MD   Stopped at 01/13/18 1130    glucose chewable tablet 16 g  16 g Oral PRN Thea Kaba MD        glucagon (GLUCAGEN) injection 1 mg  1 mg IntraMUSCular PRN Thea Kaba MD        dextrose (D50W) injection syrg 12.5-25 g  25-50 mL IntraVENous PRN Thea Kaba MD            Physical Exam:     Physical Exam:   General:  Alert, cooperative, no distress, appears stated age. Neck: Supple, symmetrical, trachea midline, no adenopathy, thyroid: no enlargement/tenderness/nodules, no carotid bruit and no JVD. Lungs:   Clear to auscultation bilaterally. Heart:  Regular rate and rhythm, S1, S2 normal, no murmur, click, rub or gallop. Abdomen:   Soft, non-tender. Bowel sounds normal. No masses,  No organomegaly. Extremities: Extremities normal, atraumatic, no cyanosis or edema.    Skin: Skin color, texture, turgor normal. No rashes or lesions         Data Review:    CBC w/Diff    Recent Labs      01/14/18   0601  01/13/18   0430  01/12/18   1454   WBC  9.1 8. 6  11.9   RBC  4.03*  4.16*  4.55*   HGB  12.3*  12.7*  14.0   HCT  37.5  37.8  41.8   MCV  93.1  90.9  91.9   MCH  30.5  30.5  30.8   MCHC  32.8  33.6  33.5   RDW  13.6  13.5  13.7    Recent Labs      01/14/18   0601  01/13/18   0430  01/12/18   1454   MONOS  7  9  5   EOS  0  0  2   BASOS  0  0  0   RDW  13.6  13.5  13.7        Comprehensive Metabolic Profile    Recent Labs      01/14/18   0601  01/13/18   1400  01/13/18   0915   NA  138  138  Critical value verified. Called to and read back by:   K  4.1  3.9  2.4*   CL  99*  99*  69*   CO2  35*  33*  22   BUN  16  18  17   CREA  1.20  1.34*  1.35*    Recent Labs      01/14/18   0601 01/13/18   1400  01/13/18   0915  01/13/18   0430  01/12/18   1454   CA  9.1  8.7  6.5*  9.0  9.4   ALB   --   3.7   --   3.7  3.9   TP   --   6.6   --   7.2  7.8   SGOT   --   86*   --   117*  200*   TBILI   --   0.4   --   0.5  0.7                        Impression:       Active Hospital Problems    Diagnosis Date Noted    Hypokalemia 10/37/0314    Metabolic alkalosis 50/46/2126    DM type 2 (diabetes mellitus, type 2) ([facility] 08/75/0085    Systolic dysfunction 75/57/5108    Cardiac arrest ([facility] 01/12/2018    Contusion 01/12/2018      K normalized, Calcium , yesterday's lab of low calcium was lab error. Renal function is better. Plan:     Continue K replacement,add Aldactone & Mag. Will follow. Hold Demadex as he is quite  Alkalotic.       Alda Gibbons MD

## 2018-01-14 NOTE — CONSULTS
[address] [facility]    Cj García  MR#: [id number]  : 1950  ACCOUNT #: [de-identified]   DATE OF SERVICE: 2018    REFERRING PHYSICIAN:  Nilsa Oates MD, cardiologist.    REASON FOR CONSULTATION:  Low potassium in a patient with recurrent cardiac arrest and getting shocks from the AICD or the defibrillator. Potassium was as low as 2.4 when the consult was called. IMPRESSION:  Acute hypokalemia with low potassium causing arrhythmias. Cause of this acute hyperkalemia is not exactly clear, but given the clinical scenario and the history so far, most likely it is medication induced. During this morning, blood sugar was reported more than 1100, which is a lab error most likely and also patient received insulin during that time. Moreover, the patient claims that he had several bouts of large volume of vomiting last night and now having nausea. Since this morning, no urine output so far. The plan is to start insulin drip given that blood sugar is more than 1100, which I totally disagree. I suspect that blood sugar of 1100 was from the same line the IV drip was going on for the amiodarone. Now, the Accu-Chek has come down to 177 and patient is back to his baseline. Besides that, there was the medication suspicion; he was on diuretics regularly, Demadex, at home with potassium replacement, also along with the spironolactone. Beside volume loss from the vomiting, no other loss of GI source is clear at this time, though he is on a regular laxative. Denies taking any illicit drugs like licorice, ingestions or ingesting any aric. He claims that he never had this kind of low potassium, though he is on diuretics and potassium replacement. At this time, patient is mentally clear, not having too much arrhythmia. Since this morning, he has received a total of 120 mEq potassium supplement either way, boluses or orally or through parenteral route.   He is not making any urine. Next, replacement potassium of 40 mEq is scheduled at 4:00. PLAN:  1. The patient needs to be monitored like the way he is in ICU. 2.  Does not need an insulin drip at all. 3.  Blood sugar should be monitored carefully and sliding scale insulin if needed should be given. It is worth to mention that insulin definitely can cause low potassium by redistribution. 4.  Keep a close eye on the urine output. As soon as he makes urine, we must check his urine random potassium, also the urine chloride, as well as urine sodium to calculate the fractional excretion of sodium along with urine spot creatinine. He has known renal failure, but not bad. 5.  Supplement of potassium chloride is scheduled at 4:00. Before we replace it, we must have stat BMP results which I requested long back, but still not done. Also need a blood gas to see whether he is alkalotic or not and, in the meanwhile, review all his home medication, which he is not totally clear. Subsequently, based on the clinical course, we will decide about the potassium replacement. Please note to be careful with aggressive potassium replacement in a patient who has known renal failure and now not making much urine, so aggressive potassium replacement in a patient without urine output may also put him at risk for high potassium. HISTORY OF PRESENT ILLNESS:  This pleasant 29-year-old  male for the first time I have seen on consultation for evaluation of low potassium. Patient is now waking up. In the morning, he was quite confused and could not give any history. He has extensive cardiac problems, though all of which he cannot mention. He has history of ischemic cardiomyopathy with severely reduced ejection fraction. Recently, he was at [facility].  As per the note, last echocardiogram exam in 12/2017 showed ejection fraction only 20% with akinesis of the apex and the anterior segment.   During the last hospitalization, he was having cardiac arrest and was noted to have ventricular tachycardia. He was stabilized and was sent home on amiodarone 200 mg twice daily. Subsequently, he was in the parking lot on the day of admission when someone noticed him falling down, hitting his head and unresponsive. He had no pulse at the time. As per the note, EMS was called and noted him to be in ventricular tachycardia. He required manual shock to bring him out of the ventricular tachycardia. Subsequently, he did have ICD discharge also in the emergency room while he was working up and appears to be in a paced rhythm. Currently, patient in the ICU bed, having nausea. Heart rate is controlled. Not having any arrhythmias at this time. PAST MEDICAL HISTORY:  Includes history of ischemic cardiomyopathy with coronary artery disease with prior MI, bypass surgery, as well as old stents, paroxysmal atrial fibrillation, hypertension, type 2 diabetes mellitus, sleep apnea, systolic and diastolic heart failure and he also has lost almost 100 pounds as per the family member. No mention of renal failure, but the renal function is not normal.  Also, at the point he was in the [facility], his potassium was running between more than 3.5-4.5 and he was also on the potassium supplement along with diuretics. MEDICATIONS:  Not clear exactly. List of medication in the hospital chart is not available at home, but as per the note from the cardiologist it shows that he was taking Lipitor, Aldactone, Coreg, Senokot, magnesium oxide, Coreg 3.125 mg twice daily, amiodarone 200 mg twice daily, potassium chloride 20 mEq once a day, Demadex 20 mg p.o. daily, aspirin, Amaryl, Glucophage, Eliquis, Zyloprim, multivitamin, Linzess, Percocet, Nasonex, TobraDex and Flexeril. ALLERGIES:  NO KNOWN DRUG ALLERGY. REVIEW OF SYSTEMS:    GENERAL:  At this time, well-built, well-nourished gentleman.   CARDIAC:  No chest pain, no shortness of breath, but had intermittent V-tach and shock from the defibrillator. RESPIRATORY:  No cough, no wheezing, no hemoptysis. GASTROINTESTINAL:  Having nausea, had large volume of vomiting yesterday, several episodes. FAMILY HISTORY:  Not clear. SOCIAL HISTORY:  Not clear. PHYSICAL EXAMINATION:    GENERAL:  At this time, alert, awake and oriented. VITAL SIGNS:  Temperature 97.7, heart rate varying between 58-60 per minute, blood pressure 93/63, mean arterial pressure of 73. Occasionally, the blood pressure 116/63 also, on oxygen 2 liters nasal cannula and oxygen saturations 100%. Weight is 84.7 kilograms. HEENT:  Oral mucosa moist.  Jugular venous pressure not distended. NECK:  Supple. LUNGS:  Good air entry into both sides. HEART:  S1, S2.  AICD site is palpable. ABDOMEN:  Obese, could not appreciate any organomegaly. EXTREMITIES:  Ankles, negative edema. RELEVANT LABORATORY DATA:  The last Accu-Chek is 177. In the early morning, it was noted as more than 600. Electrolytes at 1:30 and 9:00, sodium not checked, same critical value, I guess because his blood sugar was checked at more than 1100, that may be showing pseudohyponatremia, but no repeat chemistry being done. Potassium was 2.4, chloride was 69, CO2 of 22, glucose of 1169, blood urea nitrogen of 17, creatinine of 1.5, calcium of 6.5, which is unusual for him. EGFR of more than 60 mL per minute. MB fraction less than 1. Troponin 0.18. WBC of 8.6 with a hemoglobin of 12.7, hematocrit of 37.8, platelets of 261,250. A blood gas ordered last night shows pH of 7.54 with a pCO2 of 35, pO2 of 176, bicarbonate of 30, 100% on 4 liters of oxygen. No urinalysis being done. Chest x-ray portable on 01/12/2018 shows low lung volumes, cardiomegaly with no definitive evidence of any pneumonia or heart failure.   CT of the head without contrast was done at the time of admission and shows no acute intracranial abnormality, chronic left maxillary sinusitis. EKG shows no significant change. Ventricular rate was 63, atrial rate was 63. QT interval was prolonged. LIST OF MEDICATIONS CURRENTLY IN THE HOSPITAL:  Are as follows:  Pepcid 20 mg daily, heparin 5000 units subQ q.8h., insulin 4 times daily on sliding scale, magnesium 2 grams was given, K-Dur 40 mEq every 4 hours times 3 was given. Potassium chloride also on IV 10 mEq/100 mL per hour x4. Also, two more doses of boluses of potassium. Amiodarone drip, lidocaine drip also. IMPRESSION AND PLAN:  As explained in the initial part of the consultation and further recommendations will be given based on hospital course.       Trudy Reyes MD       [facility] [place]  D: 01/13/2018 14:31     T: 01/13/2018 15:27  JOB #: [id number]

## 2018-01-14 NOTE — PROGRESS NOTES
[facility]  Progress Note    Patient: Manohar Manley Age: 74 y.o. : 1950 MR#: [id number] SSN: [id number]  Date: 2018     Subjective:     Pt reports no recollection of cardiac arrest, but remembers everything from this morning. Denies any chest pain, SOB, N/V/D. Spoke with sisters and brothers at bedside. States no recent BM. Pt could name all family members. AAOx3. Assessment/Plan:   1. V-tach cardiac arrest s/p ICD shock - Continue IV amiodarone drip per cardiology. Will start heparin drip per cards. 2. Recurrent Vent tachycardia recently admitted over SPA, on amiodarone. 3. Ischemic cardiomyopathy with EF 20%, s/p BI-V ICD  4. Chronic systolic CHF: coreg with hold parameters. 5. Paroxysmal Afib: NSR on tele  6. CAD  7. HTN, controlled: BP low, only use coreg per parameters. 8. DM-2: continue SSI. Error noted on glucose results, repeat BMP and accu checks with BG now in high 100's. 9. Hypokalemia: replaced and resolved. Replacement as needed. 10. Acute renal insufficiency: improving nephro consult appreciated. 11. Metabolic alkalosis: nephro following, monitor  12. Elevated liver enzymes: pt is asymptomatic.  may statin tomorrow per cards. 13. Chest wall pain: no fractures noted on CT chest, will restart Eliquis 5mg BID per cards rec. Heparin subq dc'd. 14. Constipation: dulcolax prn.     Case discussed with:  [x]Patient  [x]Family  [x]Nursing  []Case Management  DVT Prophylaxis:  []Lovenox  []Hep SQ  []SCDs  [x]Eliquis   []On Heparin gtt    Objective:   VS:   Visit Vitals    BP 99/62    Pulse 65    Temp 98.6 °F (37 °C)    Resp 17    Ht 6' 2\" (1.88 m)    Wt 86.6 kg (190 lb 14.7 oz)    SpO2 96%    BMI 24.51 kg/m2      Tmax/24hrs: Temp (24hrs), Av.7 °F (36.5 °C), Min:97 °F (36.1 °C), Max:98.6 °F (37 °C)    Intake/Output Summary (Last 24 hours) at 18 1617  Last data filed at 18 1805   Gross per 24 hour   Intake 1001.58 ml   Output              200 ml   Net           801.58 ml       General:  Awake, alert, NAD, AAOx4  Cardiovascular:  RRR  Pulmonary:  CTA  GI:  NT, normal BS  Extremities:  No edema or cyanosis      Labs:    Recent Results (from the past 24 hour(s))   GLUCOSE, POC    Collection Time: 01/13/18  5:03 PM   Result Value Ref Range    Glucose (POC) 140 (H) 70 - 110 mg/dL   URINALYSIS W/ RFLX MICROSCOPIC    Collection Time: 01/13/18  6:00 PM   Result Value Ref Range    Color YELLOW      Appearance CLEAR      Specific gravity 1.025 1.005 - 1.030      pH (UA) 5.0 5.0 - 8.0      Protein NEGATIVE  NEG mg/dL    Glucose 250 (A) NEG mg/dL    Ketone TRACE (A) NEG mg/dL    Bilirubin NEGATIVE  NEG      Blood SMALL (A) NEG      Urobilinogen 0.2 0.2 - 1.0 EU/dL    Nitrites NEGATIVE  NEG      Leukocyte Esterase NEGATIVE  NEG     POTASSIUM, UR, RANDOM    Collection Time: 01/13/18  6:00 PM   Result Value Ref Range    Potassium urine, random 56 12.0 - 62 MMOL/L   SODIUM, UR, RANDOM    Collection Time: 01/13/18  6:00 PM   Result Value Ref Range    Sodium urine, random <5 (L) 20 - 110 MMOL/L   PROTEIN/CREATININE RATIO, URINE    Collection Time: 01/13/18  6:00 PM   Result Value Ref Range    Protein, urine random 38 (H) <11.9 mg/dL    Creatinine, urine 146.00 (H) 30 - 125 mg/dL    Protein/Creat.  urine Ratio 0.3     URINE MICROSCOPIC ONLY    Collection Time: 01/13/18  6:00 PM   Result Value Ref Range    WBC NEGATIVE  0 - 4 /hpf    RBC 20 to 40 0 - 5 /hpf    Epithelial cells NEGATIVE  0 - 5 /lpf    Bacteria NEGATIVE  NEG /hpf    Mucus 1+ (A) NEG /lpf    Hyaline cast 20 to 40 0 - 2 /lpf   GLUCOSE, POC    Collection Time: 01/13/18  9:05 PM   Result Value Ref Range    Glucose (POC) 147 (H) 70 - 110 mg/dL   HEMOGLOBIN A1C W/O EAG    Collection Time: 01/14/18  6:01 AM   Result Value Ref Range    Hemoglobin A1c 6.6 (H) 4.2 - 5.6 %   CBC WITH AUTOMATED DIFF    Collection Time: 01/14/18  6:01 AM   Result Value Ref Range    WBC 9.1 4.6 - 13.2 K/uL    RBC 4.03 (L) 4.70 - 5.50 M/uL    HGB 12.3 (L) 13.0 - 16.0 g/dL    HCT 37.5 36.0 - 48.0 %    MCV 93.1 74.0 - 97.0 FL    MCH 30.5 24.0 - 34.0 PG    MCHC 32.8 31.0 - 37.0 g/dL    RDW 13.6 11.6 - 14.5 %    PLATELET 987 255 - 787 K/uL    MPV 10.3 9.2 - 11.8 FL    NEUTROPHILS 66 40 - 73 %    LYMPHOCYTES 27 21 - 52 %    MONOCYTES 7 3 - 10 %    EOSINOPHILS 0 0 - 5 %    BASOPHILS 0 0 - 2 %    ABS. NEUTROPHILS 6.0 1.8 - 8.0 K/UL    ABS. LYMPHOCYTES 2.4 0.9 - 3.6 K/UL    ABS. MONOCYTES 0.7 0.05 - 1.2 K/UL    ABS. EOSINOPHILS 0.0 0.0 - 0.4 K/UL    ABS.  BASOPHILS 0.0 0.0 - 0.1 K/UL    DF AUTOMATED     METABOLIC PANEL, BASIC    Collection Time: 01/14/18  6:01 AM   Result Value Ref Range    Sodium 138 136 - 145 mmol/L    Potassium 4.1 3.5 - 5.5 mmol/L    Chloride 99 (L) 100 - 108 mmol/L    CO2 35 (H) 21 - 32 mmol/L    Anion gap 4 3.0 - 18 mmol/L    Glucose 145 (H) 74 - 99 mg/dL    BUN 16 7.0 - 18 MG/DL    Creatinine 1.20 0.6 - 1.3 MG/DL    BUN/Creatinine ratio 13 12 - 20      GFR est AA >60 >60 ml/min/1.73m2    GFR est non-AA >60 >60 ml/min/1.73m2    Calcium 9.1 8.5 - 10.1 MG/DL   ALDOSTERONE/RENIN ACTIVITY    Collection Time: 01/14/18  6:01 AM   Result Value Ref Range    Source BLOOD      Renin Activity PENDING ng/mL/hr    Aldosterone PENDING ng/dL   GLUCOSE, POC    Collection Time: 01/14/18  7:55 AM   Result Value Ref Range    Glucose (POC) 155 (H) 70 - 110 mg/dL   GLUCOSE, POC    Collection Time: 01/14/18 12:00 PM   Result Value Ref Range    Glucose (POC) 250 (H) 70 - 110 mg/dL   GLUCOSE, POC    Collection Time: 01/14/18  3:53 PM   Result Value Ref Range    Glucose (POC) 174 (H) 70 - 110 mg/dL       Signed By: Mauricio Davila PA-C     January 14, 2018 4:17 PM

## 2018-01-14 NOTE — PROGRESS NOTES
[facility]      CARDIOLOGY PROGRESS NOTE  RECS:  1. Ventricular tachycardia with cardiac arrest, requiring resuscitation with requirement of shocks. Recurrent Vtach due to profound hypokalemia- which is now resolved. Will switch to po amiodarone. 2.  Ischemic cardiomyopathy with severely reduced systolic function and ejection fraction of 20% on echocardiogram in December of 2017. Not on ACE-I due to low BP  3. Coronary artery disease with old myocardial infarction with extensive ischemic cardiomyopathy- denies chest pain. - will proceed with stress test tomorrow. 4.  History of paroxysmal atrial fibrillation-   5. Hypokalemia. 6.  Acute renal insufficiency with increased creatinine- improving  7. Elevated liver enzymes, etiology unclear. will resume statin tomorrow. Will f/u after stress test.  If no rib fractures, start eliquis. ASSESSMENT:  Hospital Problems  Never Reviewed          Codes Class Noted POA    Cardiac arrest [facility]) ICD-10-CM: I46.9  ICD-9-CM: 427.5  1/12/2018 Unknown        Contusion ICD-10-CM: T14. 8XXA  ICD-9-CM: 924.9  1/12/2018 Unknown                SUBJECTIVE:  No CP or SOB    OBJECTIVE:    VS:   Visit Vitals    /62    Pulse 60    Temp 97.7 °F (36.5 °C)    Resp 17    Ht 6' 2\" (1.88 m)    Wt 86.6 kg (190 lb 14.7 oz)    SpO2 100%    BMI 24.51 kg/m2         Intake/Output Summary (Last 24 hours) at 01/14/18 0838  Last data filed at 01/13/18 1805   Gross per 24 hour   Intake          1241.58 ml   Output              400 ml   Net           841.58 ml     TELE: normal sinus rhythm    General: in no apparent distress  HENT: Normocephalic, atraumatic. Normal external eye.   Neck :  negative  Cardiac:  regular rate and rhythm, S1, S2 normal, no murmur, click, rub or gallop  Lungs: clear to auscultation bilaterally  Abdomen: Soft, nontender, no masses  Extremities:  No edema      Labs: Results:       Chemistry Recent Labs      01/14/18   0601 01/13/18   1400  01/13/18   0915  01/13/18   0430  01/12/18   1454   GLU  145*  198*  1169*  229*  296*   NA  138  138  Critical value verified. Called to and read back by:  138  138   K  4.1  3.9  2.4*  2.6*  3.3*   CL  99*  99*  69*  96*  95*   CO2  35*  33*  22  30  28   BUN  16  18  17  21*  19*   CREA  1.20  1.34*  1.35*  1.39*  1.86*   CA  9.1  8.7  6.5*  9.0  9.4   AGAP  4  6  [id number]*  12  15   BUCR  13  13  13  15  10*   AP   --   62   --   64  79   TP   --   6.6   --   7.2  7.8   ALB   --   3.7   --   3.7  3.9   GLOB   --   2.9   --   3.5  3.9   AGRAT   --   1.3   --   1.1  1.0      CBC w/Diff Recent Labs      01/14/18   0601  01/13/18   0430  01/12/18   1454   WBC  9.1  8.6  11.9   RBC  4.03*  4.16*  4.55*   HGB  12.3*  12.7*  14.0   HCT  37.5  37.8  41.8   PLT  175  184  249   GRANS  66  80*  42   LYMPH  27  11*  51   EOS  0  0  2      Cardiac Enzymes Recent Labs      01/13/18   0915  01/13/18   0430   CPK  286  315*   CKND1  CALCULATION NOT PERFORMED WHEN RESULT IS BELOW LINEAR LIMIT  0.8      Coagulation No results for input(s): PTP, INR, APTT in the last 72 hours. No lab exists for component: INREXT, INREXT    Lipid Panel No results found for: CHOL, CHOLPOCT, CHOLX, CHLST, CHOLV, 373061, HDL, LDL, LDLC, DLDLP, 216967, VLDLC, VLDL, TGLX, TRIGL, TRIGP, TGLPOCT, CHHD, CHHDX   BNP No results for input(s): BNPP in the last 72 hours.    Liver Enzymes Recent Labs      01/13/18   1400   TP  6.6   ALB  3.7   AP  62   SGOT  86*      Thyroid Studies No results found for: T4, T3U, TSH, TSHEXT, TSHEXT           Baltazar Boswell MD

## 2018-01-15 LAB
ANION GAP SERPL CALC-SCNC: 6 MMOL/L (ref 3–18)
ANION GAP SERPL CALC-SCNC: ABNORMAL MMOL/L (ref 3–18)
BUN SERPL-MCNC: 16 MG/DL (ref 7–18)
BUN SERPL-MCNC: 17 MG/DL (ref 7–18)
BUN/CREAT SERPL: 13 (ref 12–20)
BUN/CREAT SERPL: 16 (ref 12–20)
CALCIUM SERPL-MCNC: 6.5 MG/DL (ref 8.5–10.1)
CALCIUM SERPL-MCNC: 9 MG/DL (ref 8.5–10.1)
CHLORIDE SERPL-SCNC: 100 MMOL/L (ref 100–108)
CHLORIDE SERPL-SCNC: 69 MMOL/L (ref 100–108)
CO2 SERPL-SCNC: 22 MMOL/L (ref 21–32)
CO2 SERPL-SCNC: 33 MMOL/L (ref 21–32)
CREAT SERPL-MCNC: 1.03 MG/DL (ref 0.6–1.3)
CREAT SERPL-MCNC: 1.35 MG/DL (ref 0.6–1.3)
GLUCOSE BLD STRIP.AUTO-MCNC: 126 MG/DL (ref 70–110)
GLUCOSE BLD STRIP.AUTO-MCNC: 156 MG/DL (ref 70–110)
GLUCOSE BLD STRIP.AUTO-MCNC: 168 MG/DL (ref 70–110)
GLUCOSE SERPL-MCNC: 1169 MG/DL (ref 74–99)
GLUCOSE SERPL-MCNC: 155 MG/DL (ref 74–99)
MAGNESIUM SERPL-MCNC: 1.9 MG/DL (ref 1.6–2.6)
POTASSIUM SERPL-SCNC: 2.4 MMOL/L (ref 3.5–5.5)
POTASSIUM SERPL-SCNC: 3.6 MMOL/L (ref 3.5–5.5)
SODIUM SERPL-SCNC: 117 MMOL/L (ref 136–145)
SODIUM SERPL-SCNC: 139 MMOL/L (ref 136–145)

## 2018-01-15 PROCEDURE — 74011250637 HC RX REV CODE- 250/637: Performed by: PHYSICIAN ASSISTANT

## 2018-01-15 PROCEDURE — 36415 COLL VENOUS BLD VENIPUNCTURE: CPT | Performed by: INTERNAL MEDICINE

## 2018-01-15 PROCEDURE — 74011250637 HC RX REV CODE- 250/637: Performed by: NURSE PRACTITIONER

## 2018-01-15 PROCEDURE — A9500 TC99M SESTAMIBI: HCPCS

## 2018-01-15 PROCEDURE — 82962 GLUCOSE BLOOD TEST: CPT

## 2018-01-15 PROCEDURE — 83735 ASSAY OF MAGNESIUM: CPT | Performed by: INTERNAL MEDICINE

## 2018-01-15 PROCEDURE — 74011250637 HC RX REV CODE- 250/637: Performed by: INTERNAL MEDICINE

## 2018-01-15 PROCEDURE — 80048 BASIC METABOLIC PNL TOTAL CA: CPT | Performed by: INTERNAL MEDICINE

## 2018-01-15 PROCEDURE — 74011000250 HC RX REV CODE- 250: Performed by: INTERNAL MEDICINE

## 2018-01-15 PROCEDURE — 74011250637 HC RX REV CODE- 250/637: Performed by: EMERGENCY MEDICINE

## 2018-01-15 PROCEDURE — 65660000004 HC RM CVT STEPDOWN

## 2018-01-15 RX ORDER — CYCLOBENZAPRINE HCL 10 MG
10 TABLET ORAL
COMMUNITY

## 2018-01-15 RX ORDER — LANOLIN ALCOHOL/MO/W.PET/CERES
400 CREAM (GRAM) TOPICAL DAILY
COMMUNITY

## 2018-01-15 RX ORDER — GLIMEPIRIDE 4 MG/1
4 TABLET ORAL 2 TIMES DAILY WITH MEALS
COMMUNITY

## 2018-01-15 RX ORDER — AMIODARONE HYDROCHLORIDE 200 MG/1
200 TABLET ORAL 2 TIMES DAILY
Status: DISCONTINUED | OUTPATIENT
Start: 2018-01-15 | End: 2018-01-16 | Stop reason: HOSPADM

## 2018-01-15 RX ORDER — DOCUSATE SODIUM 100 MG/1
100 CAPSULE, LIQUID FILLED ORAL DAILY
COMMUNITY

## 2018-01-15 RX ORDER — SENNOSIDES 8.6 MG/1
1 TABLET ORAL
COMMUNITY

## 2018-01-15 RX ORDER — TORSEMIDE 20 MG/1
20 TABLET ORAL 2 TIMES DAILY
COMMUNITY
End: 2018-01-16

## 2018-01-15 RX ORDER — AMIODARONE HYDROCHLORIDE 200 MG/1
200 TABLET ORAL 2 TIMES DAILY WITH MEALS
COMMUNITY

## 2018-01-15 RX ORDER — ALLOPURINOL 100 MG/1
200 TABLET ORAL EVERY EVENING
COMMUNITY

## 2018-01-15 RX ORDER — POTASSIUM CHLORIDE 750 MG/1
20 CAPSULE, EXTENDED RELEASE ORAL 2 TIMES DAILY
COMMUNITY

## 2018-01-15 RX ORDER — CYCLOBENZAPRINE HCL 10 MG
10 TABLET ORAL
Status: DISCONTINUED | OUTPATIENT
Start: 2018-01-15 | End: 2018-01-16 | Stop reason: HOSPADM

## 2018-01-15 RX ORDER — DOCUSATE SODIUM 100 MG/1
100 CAPSULE, LIQUID FILLED ORAL 2 TIMES DAILY
Status: DISCONTINUED | OUTPATIENT
Start: 2018-01-15 | End: 2018-01-16 | Stop reason: HOSPADM

## 2018-01-15 RX ORDER — ALLOPURINOL 300 MG/1
300 TABLET ORAL DAILY
COMMUNITY

## 2018-01-15 RX ORDER — FAMOTIDINE 20 MG/1
20 TABLET, FILM COATED ORAL 2 TIMES DAILY
COMMUNITY

## 2018-01-15 RX ORDER — METFORMIN HYDROCHLORIDE 850 MG/1
TABLET ORAL 2 TIMES DAILY
COMMUNITY

## 2018-01-15 RX ORDER — ATORVASTATIN CALCIUM 20 MG/1
20 TABLET, FILM COATED ORAL
COMMUNITY

## 2018-01-15 RX ORDER — CARVEDILOL 3.12 MG/1
TABLET ORAL 2 TIMES DAILY WITH MEALS
COMMUNITY

## 2018-01-15 RX ORDER — POLYETHYLENE GLYCOL 3350 17 G/17G
17 POWDER, FOR SOLUTION ORAL DAILY
Status: DISCONTINUED | OUTPATIENT
Start: 2018-01-16 | End: 2018-01-16 | Stop reason: HOSPADM

## 2018-01-15 RX ORDER — SODIUM CHLORIDE 9 MG/ML
250 INJECTION, SOLUTION INTRAVENOUS ONCE
Status: DISPENSED | OUTPATIENT
Start: 2018-01-15 | End: 2018-01-15

## 2018-01-15 RX ORDER — AMIODARONE HYDROCHLORIDE 200 MG/1
200 TABLET ORAL DAILY
Status: DISCONTINUED | OUTPATIENT
Start: 2018-01-29 | End: 2018-01-16 | Stop reason: HOSPADM

## 2018-01-15 RX ADMIN — CARVEDILOL 3.12 MG: 3.12 TABLET, FILM COATED ORAL at 12:13

## 2018-01-15 RX ADMIN — POTASSIUM CHLORIDE 20 MEQ: 20 TABLET, EXTENDED RELEASE ORAL at 12:13

## 2018-01-15 RX ADMIN — SENNOSIDES 8.6 MG: 8.6 TABLET, FILM COATED ORAL at 12:16

## 2018-01-15 RX ADMIN — FAMOTIDINE 20 MG: 10 INJECTION INTRAVENOUS at 12:12

## 2018-01-15 RX ADMIN — APIXABAN 5 MG: 5 TABLET, FILM COATED ORAL at 19:51

## 2018-01-15 RX ADMIN — APIXABAN 5 MG: 5 TABLET, FILM COATED ORAL at 12:13

## 2018-01-15 RX ADMIN — AMIODARONE HYDROCHLORIDE 200 MG: 200 TABLET ORAL at 12:13

## 2018-01-15 RX ADMIN — Medication 400 MG: at 12:13

## 2018-01-15 RX ADMIN — ALLOPURINOL 200 MG: 100 TABLET ORAL at 12:13

## 2018-01-15 RX ADMIN — ASPIRIN 81 MG 81 MG: 81 TABLET ORAL at 12:13

## 2018-01-15 RX ADMIN — DOCUSATE SODIUM 100 MG: 100 CAPSULE, LIQUID FILLED ORAL at 13:30

## 2018-01-15 RX ADMIN — FAMOTIDINE 20 MG: 10 INJECTION INTRAVENOUS at 21:27

## 2018-01-15 RX ADMIN — Medication 10 ML: at 21:28

## 2018-01-15 RX ADMIN — DOCUSATE SODIUM 100 MG: 100 CAPSULE, LIQUID FILLED ORAL at 19:51

## 2018-01-15 RX ADMIN — Medication 5 ML: at 14:00

## 2018-01-15 RX ADMIN — CYCLOBENZAPRINE HYDROCHLORIDE 10 MG: 10 TABLET, FILM COATED ORAL at 21:27

## 2018-01-15 RX ADMIN — AMIODARONE HYDROCHLORIDE 200 MG: 200 TABLET ORAL at 19:51

## 2018-01-15 RX ADMIN — Medication 10 ML: at 07:48

## 2018-01-15 NOTE — PROGRESS NOTES
Met with sister and brother at bedside, patient out of room for procedure  DX: Cardiac Arrest  Went to brother's work, collapsed in parking lot, bystander started CPR, medics delivered shocks  Lives alone in a 2L home with 3 steps at entrance, 2 steps inside to den and 12 steps to second floor  Retired   [address] sister, Cait Russo, XF#[phone] moving in to live with patient  Allison Minors, UO#[phone]  IADL's  +car/+ drives  + ride home at DC  DME: CPAP  meds ok: [address]  PCP: Kimberly Greenwood  FOC signed/dated/filed in patient chart for [facility] post DC  CM will continue to follow with IDT to assist with DC needs identified    500pm  Met with patient at bedside  A&O X 3  Family to bedside, permission received to speak with family present  Discussion regarding plan for Esther 80 at DC and reasons. Was already told by family that patient would probably refuse, however, wanted to discuss with patient. Patient stated that they \"refuse\" any home health care, does not like to have people in home and that is a \"wast of money\"  CM will continue to follow with IDT to assist with DC needs identified.

## 2018-01-15 NOTE — PROGRESS NOTES
NUTRITION    Nursing Referral: [facility]     RECOMMENDATIONS / PLAN:     - Add supplements: Glucerna Shake, TID.  - Monitor and encourage po/supplement intake. - Continue RD inpatient monitoring and evaluation. NUTRITION INTERVENTIONS & DIAGNOSIS:     [x] Meals/snacks: modify composition  [x] Medical food supplement therapy: Initiate    Nutrition Diagnosis: Unintentional weight loss related to inadequate oral intake as evidenced by family report of pt with unintentional weight loss of 100 lb, 34 % x 1 year PTA. Patient meets criteria for Severe Protein Calorie Malnutrition as evidenced by:  ASPEN Malnutrition Criteria  Acute Illness, Chronic Illness, or Social/Enviornmental: Chronic illness  Energy Intake: Less than/equal to 75% of est energy req for greater than/equal to 1 month  Weight Loss: Greater than 20% x 1 yr  ASPEN Malnutrition Score - Chronic Illness: 12  Chronic Illness - Malnutrition Diagnosis: Severe malnutrition. ASSESSMENT:     Pt off floor during time of visit for stress test. Spoke with family members who reported unintentional weight loss and poor meal intake PTA. Pt experiencing decreased taste perceptions and altered taste. Consumed 6 shrimp and one serving of dessert yesterday. Discussed nutritional supplements, family agreeable. Pt needs encouragement to eat.     Average po intake adequate to meet patients estimated nutritional needs:   [] Yes     [x] No   [] Unable to determine at this time    Diet: DIET DIABETIC CONSISTENT CARB Regular      Food Allergies: NKFA  Current Appetite:   [] Good     [] Fair     [x] Poor     [] Other:  Appetite/meal intake prior to admission:   [] Good     [] Fair     [x] Poor x several months     [] Other:   Feeding Limitations:  [] Swallowing difficulty    [] Chewing difficulty    [x] Other: Decreased taste perception  Current Meal Intake: Patient Vitals for the past 100 hrs:   % Diet Eaten   01/14/18 1716 25 %   01/14/18 1200 10 %       BM:  1/14  Skin Integrity: WDL   Edema: None  Pertinent Medications: Reviewed: SSI, KCL, mag-ox, bowel regimen     Recent Labs      01/15/18   0519  01/14/18   0601  01/13/18   1400   01/13/18   0430  01/12/18   1454   NA  139  138  138   < >  138  138   K  3.6  4.1  3.9   < >  2.6*  3.3*   CL  100  99*  99*   < >  96*  95*   CO2  33*  35*  33*   < >  30  28   GLU  155*  145*  198*   < >  229*  296*   BUN  16  16  18   < >  21*  19*   CREA  1.03  1.20  1.34*   < >  1.39*  1.86*   CA  9.0  9.1  8.7   < >  9.0  9.4   MG  1.9   --    --    --   1.9  1.7   ALB   --    --   3.7   --   3.7  3.9   SGOT   --    --   86*   --   117*  200*   ALT   --    --   118*   --   130*  168*    < > = values in this interval not displayed. Intake/Output Summary (Last 24 hours) at 01/15/18 1117  Last data filed at 01/14/18 2200   Gross per 24 hour   Intake           712.41 ml   Output              475 ml   Net           237.41 ml       Anthropometrics:  Ht Readings from Last 1 Encounters:   01/12/18 6' 2\" (1.88 m)     Last 3 Recorded Weights in this Encounter    01/13/18 0527 01/14/18 0616 01/15/18 0556   Weight: 84.7 kg (186 lb 11.7 oz) 86.6 kg (190 lb 14.7 oz) 86.4 kg (190 lb 7.6 oz)     Body mass index is 24.46 kg/(m^2). Weight History: Family reports a 100 lb, 34 % unintentional weight loss x 1 year PTA.      Weight Metrics 1/15/2018   Weight 190 lb 7.6 oz   BMI 24.46 kg/m2        Admitting Diagnosis: Cardiac arrest ([facility]  Contusion  Pertinent PMHx: CHF, CAD, HTN, DM2    Education Needs:        [x] None identified  [] Identified - Not appropriate at this time  []  Identified and addressed - refer to education log  Learning Limitations:   [x] None identified  [] Identified    Cultural, Jain & ethnic food preferences:  [x] None identified    [] Identified and addressed     ESTIMATED NUTRITION NEEDS:     Calories: 1126-7852 kcal (MSJx1.2-1.3) based on  [x] Actual BW: 86 kg      [] IBW   Protein: 69-86 gm (0.8-1 gm/kg) based on  [x] Actual BW      [] IBW   Fluid: 1 mL/kcal     MONITORING & EVALUATION:     Nutrition Goal(s):   1. Po intake of meals will meet >75% of patient estimated nutritional needs within the next 7 days.   Outcome:  [] Met/Ongoing    []  Not Met    [x] New/Initial Goal     Monitoring:   [x] Food and beverage intake   [x] Diet order   [x] Nutrition-focused physical findings   [x] Treatment/therapy   [] Weight   [] Enteral nutrition intake        Previous Recommendations (for follow-up assessments only):     []   Implemented       []   Not Implemented (RD to address)      [] No Longer Appropriate     [] No Recommendation Made     Discharge Planning: diabetic diet  [x] Participated in care planning, discharge planning, & interdisciplinary rounds as appropriate      Sania Hanna RD   Pager: [phone]

## 2018-01-15 NOTE — CDMP QUERY
Please clarify if this patient is being treated/managed for:    => Severe Protein Calorie Malnutrition   =>Other Explanation of clinical findings  =>Unable to Determine (no explanation of clinical findings)    The medical record reflects the following:    Risk:67 y.o.   male with a PMH of Ischemic cardiomyopathy with EF 20%, s/p BI-V ICD, chronic systolic CHF, ventricular tachycardia, CAD s/p CABG, HTN and DM-2 who presents with c/c of ICD shock. He was recently admitted 12/11/17-12/13/17 at [facility] for ventricular tachycardia and discharged home on amiodarone. Apparently his ICD has shocked him today. He fell down in the parking lot and become unresponsive. Per EMS, patient had no pulse and his rhythm was in [address] [facility]. Clinical Indicators- Add supplements: Glucerna Shake, TID.  - Monitor and encourage po/supplement intake. - Continue RD inpatient monitoring and evaluation.      NUTRITION INTERVENTIONS & DIAGNOSIS:      Meals/snacks: modify composition   Medical food supplement therapy: Initiate     Nutrition Diagnosis: Unintentional weight loss related to inadequate oral intake as evidenced by family report of pt with unintentional weight loss of 100 lb, 34 % x 1 year PTA.     Patient meets criteria for Severe Protein Calorie Malnutrition as evidenced by:  ASPEN Malnutrition Criteria  Acute Illness, Chronic Illness, or Social/Enviornmental: Chronic illness  Energy Intake: Less than/equal to 75% of est energy req for greater than/equal to 1 month  Weight Loss: Greater than 20% x 1 yr  ASPEN Malnutrition Score - Chronic Illness: 12  Chronic Illness - Malnutrition Diagnosis: Severe malnutrition.       ASSESSMENT:     Pt off floor during time of visit for stress test. Spoke with family members who reported unintentional weight loss and poor meal intake PTA. Pt experiencing decreased taste perceptions and altered taste. Consumed 6 shrimp and one serving of dessert yesterday.  Discussed nutritional supplements, family agreeable. Pt needs encouragement to eat. Please clarify and document your clinical opinion in the progress notes and discharge summary including the definitive and/or presumptive diagnosis, (suspected or probable), related to the above clinical findings. Please include clinical findings supporting your diagnosis. If you DECLINE this query or would like to communicate with [facility], please utilize the \"[employer] message box\" at the TOP of the Progress Note on the right.       Thank you,[facility] ext [phone]  DR MAHESH ARREDONDO [facility] RN

## 2018-01-15 NOTE — PROGRESS NOTES
Patient was given 10.84 mCi of Sestamibi for the Resting pictures. Patient had resting images done. After resting images, he has decided to wait to get the rest of the stress test until his cardiologist is contacted.   Will enquire tomorrow about proceeding

## 2018-01-15 NOTE — DIABETES MGMT
Glycemic Control Plan of Care    POC BG range on 01/14/2018: 155-250 mg/dL. POC BG report on 01/15/2018 at time of review: 156 mg/dL. Patient's family at bedside this morning and helped provide list of home diabetes meds. Pending result of ST and possible d/c to home. Recommendation(s):  1.) Continue best effort to encourage patient to take prescribed correctional insulin. Assessment:  Patient is 74year old with past medical history including type 2 diabetes mellitus, systolic CHF, ventricular tachycardia, hypertension, and CAD with CABG - was admitted on 01/12/2018 with report of fall while in parking lot, hit his head and unresponsive. Noted:  VT cardiac arrest.  Status post AICD shock. Type 2 diabetes mellitus with current A1c of 6.6% (01/14/2018). Most recent blood glucose values:    Results for Lryic Mccormick (MRN [id number]) as of 1/15/2018 14:38   Ref. Range 1/14/2018 07:55 1/14/2018 12:00 1/14/2018 15:53 1/14/2018 21:53   GLUCOSE,FAST - POC Latest Ref Range: 70 - 110 mg/dL 155 (H) 250 (H) 174 (H) 195 (H)     Results for Lyric Mccormick (MRN [id number]) as of 1/15/2018 14:38   Ref. Range 1/15/2018 08:01   GLUCOSE,FAST - POC Latest Ref Range: 70 - 110 mg/dL 156 (H)     Current A1C: 6.6% (01/14/2018) is equivalent to average blood glucose of 143 mg/dL during the past 2-3 months. Current hospital diabetes medications:  Correctional lispro insulin ACHS. Normal sensitivity dose. Total daily dose insulin requirement previous day: 01/14/2018. Lispro: None. Patient refused. Home diabetes medications: Per patient's family  Metformin 850 mg twice daily with food. Glipizide 4 mg 3x daily with meals. Diet: Diabetic consistent carb regular; no concentrated sweets.     Goals:  Blood glucose will be within target range of  mg/dL by 01/18/2018     Education:  ___  Refer to Diabetes Education Record             _X__  Education not indicated at this time    Bettie Champion RN CCM

## 2018-01-15 NOTE — PROGRESS NOTES
0730 Bedside and Verbal shift change report given to Susy (oncoming nurse) by Shannen Orozco RN   (offgoing nurse). Report included the following information SBAR, Kardex, Intake/Output, MAR and Alarm Parameters .

## 2018-01-15 NOTE — PROGRESS NOTES
[facility] Hospitalist Group  Progress Note    Patient: Bonilla Mitchell Age: 74 y.o. : 1950 MR#: [id number] SSN: [id number]  Date: 1/15/2018     Subjective:     Patient in NAD, awake, alert, follows commands. Patient declined ST today    Assessment/Plan:   1. V-tach cardiac arrest s/p ICD shock - on PO amiodarone  2. Recurrent Vent tachycardia recently admitted over SPA, on amiodarone. 3. Ischemic cardiomyopathy with EF 20%, s/p BI-V ICD  4. Chronic systolic CHF: coreg with hold parameters. 5. Paroxysmal Afib: NSR on tele, on eliquis  6. CAD  7. HTN, controlled: BP low, only use coreg per parameters. Not on ace/arb 2/2 low normal bp  8. DM-2: SSI  10. Acute renal insufficiency: improving nephro consult appreciated. 11. Metabolic alkalosis: nephro following, monitor  12. Elevated liver enzymes:monitor  13- Left renal Lesion noted on CT- check Renal US  14- severe protein calorie malnutrition- supplements  Dispo- home when ok with cardio.   D/w Dr Francisca Reeves  Case discussed with:  [x]Patient  [x]Family  [x]Nursing  []Case Management  DVT Prophylaxis:  []Lovenox  []Hep SQ  []SCDs  [x]Eliquis   []On Heparin gtt    Objective:   VS:   Visit Vitals    /65    Pulse 60    Temp 97.6 °F (36.4 °C)    Resp 14    Ht 6' 2\" (1.88 m)    Wt 86.4 kg (190 lb 7.6 oz)    SpO2 98%    BMI 24.46 kg/m2      Tmax/24hrs: Temp (24hrs), Av.7 °F (36.5 °C), Min:97.3 °F (36.3 °C), Max:98.3 °F (36.8 °C)      Intake/Output Summary (Last 24 hours) at 01/15/18 1540  Last data filed at 18 2200   Gross per 24 hour   Intake           592.41 ml   Output              475 ml   Net           117.41 ml       General:  Awake, follows commands  Cardiovascular:  S1S2+, RRR  Pulmonary:  CTA b/l  GI:  Soft, BS+, NT, ND  Extremities:  No edema        Labs:    Recent Results (from the past 24 hour(s))   GLUCOSE, POC    Collection Time: 18  3:53 PM   Result Value Ref Range    Glucose (POC) 174 (H) 70 - 110 mg/dL   GLUCOSE, POC    Collection Time: 01/14/18  9:53 PM   Result Value Ref Range    Glucose (POC) 195 (H) 70 - 115 mg/dL   METABOLIC PANEL, BASIC    Collection Time: 01/15/18  5:19 AM   Result Value Ref Range    Sodium 139 136 - 145 mmol/L    Potassium 3.6 3.5 - 5.5 mmol/L    Chloride 100 100 - 108 mmol/L    CO2 33 (H) 21 - 32 mmol/L    Anion gap 6 3.0 - 18 mmol/L    Glucose 155 (H) 74 - 99 mg/dL    BUN 16 7.0 - 18 MG/DL    Creatinine 1.03 0.6 - 1.3 MG/DL    BUN/Creatinine ratio 16 12 - 20      GFR est AA >60 >60 ml/min/1.73m2    GFR est non-AA >60 >60 ml/min/1.73m2    Calcium 9.0 8.5 - 10.1 MG/DL   MAGNESIUM    Collection Time: 01/15/18  5:19 AM   Result Value Ref Range    Magnesium 1.9 1.6 - 2.6 mg/dL   GLUCOSE, POC    Collection Time: 01/15/18  8:01 AM   Result Value Ref Range    Glucose (POC) 156 (H) 70 - 110 mg/dL       Signed By: Rosemary Davis MD     January 15, 2018

## 2018-01-15 NOTE — PROGRESS NOTES
[facility]      CARDIOLOGY PROGRESS NOTE  RECS:    1. Ventricular tachycardia with cardiac arrest, requiring resuscitation with requirement of shocks. Recurrent Vtach due to profound hypokalemia- which is now resolved. Continue with Amiodarone 200 mg bid for 14 days then 200  Amiodarone. Follow NUC stress test today  2. Ischemic cardiomyopathy with severely reduced systolic function and ejection fraction of 20% on echocardiogram in December of 2017. Not on ACE-I due to low BP  3. Coronary artery disease with old myocardial infarction with extensive ischemic cardiomyopathy- denies chest pain. - will proceed with stress test tomorrow. 4.  History of paroxysmal atrial fibrillation-   5. Hypokalemia-resolved   6. Acute renal insufficiency with increased creatinine- improving  7. Elevated liver enzymes, etiology unclear-will resume statin     Patient with Vtach from hypokalemia which has resolved  He declined stress test here and wants to follow up with his cardiologist.  Will repeat LFT tomorrow- if okay , will resume statin      ASSESSMENT:  Hospital Problems  Date Reviewed: 1/14/2018          Codes Class Noted POA    Hypokalemia ICD-10-CM: E87.6  ICD-9-CM: 276.8  1/14/2018 Unknown        Metabolic alkalosis UXO-54-QY: E87.3  ICD-9-CM: 276.3  1/14/2018 Clinically Undetermined        DM type 2 (diabetes mellitus, type 2) ([facility] ICD-10-CM: E11.9  ICD-9-CM: 250.00  1/14/2018 Unknown        Systolic dysfunction QOL-31-IF: I51.9  ICD-9-CM: 429.9  1/14/2018 Unknown        Cardiac arrest ([facility] ICD-10-CM: I46.9  ICD-9-CM: 427.5  1/12/2018 Unknown        Contusion ICD-10-CM: T14. 8XXA  ICD-9-CM: 924.9  1/12/2018 Unknown                SUBJECTIVE:    No CP or SOB  Feels week     OBJECTIVE:    VS:   Visit Vitals    /65    Pulse 60    Temp 97.6 °F (36.4 °C)    Resp 14    Ht 6' 2\" (1.88 m)    Wt 86.4 kg (190 lb 7.6 oz)    SpO2 98%    BMI 24.46 kg/m2         Intake/Output Summary (Last 24 hours) at 01/15/18 0847  Last data filed at 01/14/18 2200   Gross per 24 hour   Intake           712.41 ml   Output              475 ml   Net           237.41 ml     TELE: normal sinus rhythm    General: in no apparent distress  HENT: Normocephalic, atraumatic. Normal external eye. Neck :  negative  Cardiac:  regular rate and rhythm, S1, S2 normal, no murmur, click, rub or gallop  Lungs: clear to auscultation bilaterally  Abdomen: Soft, nontender, no masses  Extremities:  No edema      Labs: Results:       Chemistry Recent Labs      01/15/18   0519  01/14/18   0601  01/13/18   1400   01/13/18   0430  01/12/18   1454   GLU  155*  145*  198*   < >  229*  296*   NA  139  138  138   < >  138  138   K  3.6  4.1  3.9   < >  2.6*  3.3*   CL  100  99*  99*   < >  96*  95*   CO2  33*  35*  33*   < >  30  28   BUN  16  16  18   < >  21*  19*   CREA  1.03  1.20  1.34*   < >  1.39*  1.86*   CA  9.0  9.1  8.7   < >  9.0  9.4   AGAP  6  4  6   < >  12  15   BUCR  16  13  13   < >  15  10*   AP   --    --   62   --   64  79   TP   --    --   6.6   --   7.2  7.8   ALB   --    --   3.7   --   3.7  3.9   GLOB   --    --   2.9   --   3.5  3.9   AGRAT   --    --   1.3   --   1.1  1.0    < > = values in this interval not displayed. CBC w/Diff Recent Labs      01/14/18   0601  01/13/18   0430  01/12/18   1454   WBC  9.1  8.6  11.9   RBC  4.03*  4.16*  4.55*   HGB  12.3*  12.7*  14.0   HCT  37.5  37.8  41.8   PLT  175  184  249   GRANS  66  80*  42   LYMPH  27  11*  51   EOS  0  0  2      Cardiac Enzymes Recent Labs      01/13/18   0915  01/13/18   0430   CPK  286  315*   CKND1  CALCULATION NOT PERFORMED WHEN RESULT IS BELOW LINEAR LIMIT  0.8      Coagulation No results for input(s): PTP, INR, APTT in the last 72 hours.     No lab exists for component: INREXT, INREXT    Lipid Panel No results found for: CHOL, CHOLPOCT, CHOLX, CHLST, CHOLV, 841036, HDL, LDL, LDLC, DLDLP, 681310, VLDLC, VLDL, TGLX, TRIGL, TRIGP, TGLPOCT, CHHD, CHHDX   BNP No results for input(s): BNPP in the last 72 hours. Liver Enzymes Recent Labs      01/13/18   1400   TP  6.6   ALB  3.7   AP  62   SGOT  86*      Thyroid Studies No results found for: T4, T3U, TSH, TSHEXT, TSHEXT           JAVIER BennettC supervised    I have independently evaluated and examined the patient. All relevant labs and testing data's are reviewed. Care plan discussed and updated after review.     Liban Nogueira MD

## 2018-01-16 VITALS
SYSTOLIC BLOOD PRESSURE: 104 MMHG | HEART RATE: 79 BPM | BODY MASS INDEX: 24.45 KG/M2 | WEIGHT: 190.48 LBS | RESPIRATION RATE: 20 BRPM | TEMPERATURE: 97.4 F | HEIGHT: 74 IN | OXYGEN SATURATION: 100 % | DIASTOLIC BLOOD PRESSURE: 68 MMHG

## 2018-01-16 LAB
ALBUMIN SERPL-MCNC: 3 G/DL (ref 3.4–5)
ALBUMIN/GLOB SERPL: 0.9 {RATIO} (ref 0.8–1.7)
ALP SERPL-CCNC: 63 U/L (ref 45–117)
ALT SERPL-CCNC: 79 U/L (ref 16–61)
ANION GAP SERPL CALC-SCNC: 7 MMOL/L (ref 3–18)
AST SERPL-CCNC: 40 U/L (ref 15–37)
BASOPHILS # BLD: 0 K/UL (ref 0–0.06)
BASOPHILS NFR BLD: 0 % (ref 0–2)
BILIRUB DIRECT SERPL-MCNC: 0.1 MG/DL (ref 0–0.2)
BILIRUB SERPL-MCNC: 0.5 MG/DL (ref 0.2–1)
BUN SERPL-MCNC: 16 MG/DL (ref 7–18)
BUN/CREAT SERPL: 16 (ref 12–20)
CALCIUM SERPL-MCNC: 9 MG/DL (ref 8.5–10.1)
CHLORIDE SERPL-SCNC: 101 MMOL/L (ref 100–108)
CO2 SERPL-SCNC: 31 MMOL/L (ref 21–32)
CREAT SERPL-MCNC: 1.01 MG/DL (ref 0.6–1.3)
DIFFERENTIAL METHOD BLD: ABNORMAL
EOSINOPHIL # BLD: 0.1 K/UL (ref 0–0.4)
EOSINOPHIL NFR BLD: 3 % (ref 0–5)
ERYTHROCYTE [DISTWIDTH] IN BLOOD BY AUTOMATED COUNT: 13.6 % (ref 11.6–14.5)
GLOBULIN SER CALC-MCNC: 3.2 G/DL (ref 2–4)
GLUCOSE BLD STRIP.AUTO-MCNC: 157 MG/DL (ref 70–110)
GLUCOSE BLD STRIP.AUTO-MCNC: 173 MG/DL (ref 70–110)
GLUCOSE SERPL-MCNC: 157 MG/DL (ref 74–99)
HCT VFR BLD AUTO: 36.1 % (ref 36–48)
HGB BLD-MCNC: 11.8 G/DL (ref 13–16)
LYMPHOCYTES # BLD: 1.8 K/UL (ref 0.9–3.6)
LYMPHOCYTES NFR BLD: 35 % (ref 21–52)
MCH RBC QN AUTO: 30.6 PG (ref 24–34)
MCHC RBC AUTO-ENTMCNC: 32.7 G/DL (ref 31–37)
MCV RBC AUTO: 93.8 FL (ref 74–97)
MONOCYTES # BLD: 0.6 K/UL (ref 0.05–1.2)
MONOCYTES NFR BLD: 11 % (ref 3–10)
NEUTS SEG # BLD: 2.6 K/UL (ref 1.8–8)
NEUTS SEG NFR BLD: 51 % (ref 40–73)
PLATELET # BLD AUTO: 165 K/UL (ref 135–420)
PMV BLD AUTO: 10.7 FL (ref 9.2–11.8)
POTASSIUM SERPL-SCNC: 4 MMOL/L (ref 3.5–5.5)
PROT SERPL-MCNC: 6.2 G/DL (ref 6.4–8.2)
RBC # BLD AUTO: 3.85 M/UL (ref 4.7–5.5)
SODIUM SERPL-SCNC: 139 MMOL/L (ref 136–145)
WBC # BLD AUTO: 5.2 K/UL (ref 4.6–13.2)

## 2018-01-16 PROCEDURE — 74011250637 HC RX REV CODE- 250/637: Performed by: INTERNAL MEDICINE

## 2018-01-16 PROCEDURE — 74011250637 HC RX REV CODE- 250/637: Performed by: NURSE PRACTITIONER

## 2018-01-16 PROCEDURE — 74011250637 HC RX REV CODE- 250/637: Performed by: EMERGENCY MEDICINE

## 2018-01-16 PROCEDURE — 85025 COMPLETE CBC W/AUTO DIFF WBC: CPT | Performed by: INTERNAL MEDICINE

## 2018-01-16 PROCEDURE — 82962 GLUCOSE BLOOD TEST: CPT

## 2018-01-16 PROCEDURE — 74011000250 HC RX REV CODE- 250: Performed by: INTERNAL MEDICINE

## 2018-01-16 PROCEDURE — 80053 COMPREHEN METABOLIC PANEL: CPT | Performed by: INTERNAL MEDICINE

## 2018-01-16 PROCEDURE — 82248 BILIRUBIN DIRECT: CPT | Performed by: INTERNAL MEDICINE

## 2018-01-16 PROCEDURE — 74011250637 HC RX REV CODE- 250/637: Performed by: PHYSICIAN ASSISTANT

## 2018-01-16 PROCEDURE — 36415 COLL VENOUS BLD VENIPUNCTURE: CPT | Performed by: INTERNAL MEDICINE

## 2018-01-16 RX ORDER — POTASSIUM CHLORIDE 20 MEQ/1
20 TABLET, EXTENDED RELEASE ORAL
Status: DISCONTINUED | OUTPATIENT
Start: 2018-01-16 | End: 2018-01-16

## 2018-01-16 RX ORDER — POLYETHYLENE GLYCOL 3350 17 G/17G
17 POWDER, FOR SOLUTION ORAL DAILY
Qty: 30 PACKET | Refills: 2 | Status: SHIPPED | OUTPATIENT
Start: 2018-01-16

## 2018-01-16 RX ORDER — GUAIFENESIN 100 MG/5ML
81 LIQUID (ML) ORAL DAILY
Qty: 30 TAB | Refills: 2 | Status: SHIPPED | OUTPATIENT
Start: 2018-01-17

## 2018-01-16 RX ORDER — ATORVASTATIN CALCIUM 20 MG/1
20 TABLET, FILM COATED ORAL
Status: DISCONTINUED | OUTPATIENT
Start: 2018-01-16 | End: 2018-01-16 | Stop reason: HOSPADM

## 2018-01-16 RX ORDER — SPIRONOLACTONE 25 MG/1
25 TABLET ORAL DAILY
Qty: 30 TAB | Refills: 2 | Status: SHIPPED | OUTPATIENT
Start: 2018-01-17

## 2018-01-16 RX ADMIN — ASPIRIN 81 MG 81 MG: 81 TABLET ORAL at 10:37

## 2018-01-16 RX ADMIN — APIXABAN 5 MG: 5 TABLET, FILM COATED ORAL at 10:37

## 2018-01-16 RX ADMIN — Medication 10 ML: at 06:30

## 2018-01-16 RX ADMIN — Medication 400 MG: at 10:36

## 2018-01-16 RX ADMIN — POTASSIUM CHLORIDE 20 MEQ: 20 TABLET, EXTENDED RELEASE ORAL at 10:36

## 2018-01-16 RX ADMIN — DOCUSATE SODIUM 100 MG: 100 CAPSULE, LIQUID FILLED ORAL at 11:09

## 2018-01-16 RX ADMIN — SPIRONOLACTONE 25 MG: 25 TABLET, FILM COATED ORAL at 10:36

## 2018-01-16 RX ADMIN — FAMOTIDINE 20 MG: 10 INJECTION INTRAVENOUS at 10:37

## 2018-01-16 RX ADMIN — AMIODARONE HYDROCHLORIDE 200 MG: 200 TABLET ORAL at 10:38

## 2018-01-16 RX ADMIN — BISACODYL 10 MG: 10 SUPPOSITORY RECTAL at 06:38

## 2018-01-16 RX ADMIN — POLYETHYLENE GLYCOL 3350 17 G: 17 POWDER, FOR SOLUTION ORAL at 11:09

## 2018-01-16 RX ADMIN — ALLOPURINOL 200 MG: 100 TABLET ORAL at 10:37

## 2018-01-16 NOTE — PROGRESS NOTES
Progress Note    Manohar Manley  74 y.o. Admit Date: 1/12/2018  Active Problems:    Cardiac arrest ([facility] (1/12/2018) POA: Unknown      Contusion (1/12/2018) POA: Unknown      Hypokalemia (1/14/2018) POA: Unknown      Metabolic alkalosis (9/89/1656) POA: Clinically Undetermined      DM type 2 (diabetes mellitus, type 2) ([facility] (1/14/2018) POA: Unknown      Systolic dysfunction (5/29/3343) POA: Unknown            Subjective:     Patient feels good, back to ICU for Nursing care. A comprehensive review of systems was negative except for that written in the History of Present Illness.     Objective:     Visit Vitals    /68    Pulse 79    Temp 97.4 °F (36.3 °C)    Resp 23    Ht 6' 2\" (1.88 m)    Wt 86.4 kg (190 lb 7.6 oz)    SpO2 100%    BMI 24.46 kg/m2         Intake/Output Summary (Last 24 hours) at 01/16/18 1242  Last data filed at 01/16/18 0000   Gross per 24 hour   Intake              360 ml   Output              400 ml   Net              -40 ml       Current Facility-Administered Medications   Medication Dose Route Frequency Provider Last Rate Last Dose    atorvastatin (LIPITOR) tablet 20 mg  20 mg Oral QHS Spencer Hassan MD        apixaban (ELIQUIS) tablet 5 mg  5 mg Oral BID Kerri Garcia NP   5 mg at 01/16/18 1037    amiodarone (CORDARONE) tablet 200 mg  200 mg Oral BID Kerri Garcia NP   200 mg at 01/16/18 1038    [START ON 1/29/2018] amiodarone (CORDARONE) tablet 200 mg  200 mg Oral DAILY Kerri ELSA Halarthur, NP        polyethylene glycol (MIRALAX) packet 17 g  17 g Oral DAILY Sangeeta Nunez MD   17 g at 01/16/18 1109    docusate sodium (COLACE) capsule 100 mg  100 mg Oral BID Sangeeta Nunez MD   100 mg at 01/16/18 1109    cyclobenzaprine (FLEXERIL) tablet 10 mg  10 mg Oral TID PRN Sangeeta Nunez MD   10 mg at 01/15/18 2127    aspirin chewable tablet 81 mg  81 mg Oral DAILY Spencer Hassan MD   81 mg at 01/16/18 [facility]    spironolactone (ALDACTONE) tablet 25 mg 25 mg Oral DAILY Sergo Hummel MD   25 mg at 01/16/18 1036    magnesium oxide (MAG-OX) tablet 400 mg  400 mg Oral BID Sergo Hummel MD   400 mg at 01/16/18 1036    ELECTROLYTE REPLACEMENT PROTOCOL  1 Each Other PRN Kerri Garcia, TAHIRA        potassium chloride (K-DUR, KLOR-CON) SR tablet 20 mEq  20 mEq Oral DAILY Sergo Hummel MD   20 mEq at 01/16/18 1036    senna (SENOKOT) tablet 8.6 mg  1 Tab Oral BID PRN Alecia Orourke PA-C   8.6 mg at 01/15/18 1216    allopurinol (ZYLOPRIM) tablet 200 mg  200 mg Oral DAILY Alecia Orourke PA-C   200 mg at 01/16/18 1037    carvedilol (COREG) tablet 3.125 mg  3.125 mg Oral BID WITH MEALS Alecia Orourke PA-C   Stopped at 01/15/18 1700    bisacodyl (DULCOLAX) suppository 10 mg  10 mg Rectal DAILY PRN Janet Zuleta MD   10 mg at 01/16/18 0638    sodium chloride (NS) flush 5-10 mL  5-10 mL IntraVENous Q8H Sergo Hummel MD   10 mL at 01/16/18 0630    sodium chloride (NS) flush 5-10 mL  5-10 mL IntraVENous PRN Alexandro Fry MD        famotidine (PF) (PEPCID) 20 mg in sodium chloride 0.9 % 10 mL injection  20 mg IntraVENous Q12H Alexandro Fry MD   20 mg at 01/16/18 1037    acetaminophen (TYLENOL) tablet 650 mg  650 mg Oral Q6H PRN Alexandro Fry MD        ondansetron [facility]) injection 4 mg  4 mg IntraVENous Q6H PRN Alexandro Fry MD   4 mg at 01/13/18 1425    insulin lispro (HUMALOG) injection   SubCUTAneous AC&HS Alexandro Fry MD   Stopped at 01/13/18 1130    glucose chewable tablet 16 g  16 g Oral PRN Alexandro Fry MD        glucagon (GLUCAGEN) injection 1 mg  1 mg IntraMUSCular PRN Alexandro Fry MD        dextrose (D50W) injection syrg 12.5-25 g  25-50 mL IntraVENous PRN Alexandro Fry MD            Physical Exam:     Physical Exam:   General:  Alert, cooperative, no distress, appears stated age. Neck: Supple, symmetrical, trachea midline, no adenopathy, thyroid: no enlargement/tenderness/nodules, no carotid bruit and no JVD.    Lungs:   Clear to auscultation bilaterally. Heart:  Regular rate and rhythm, S1, S2 normal, no murmur, click, rub or gallop. Abdomen:   Soft, non-tender. Bowel sounds normal. No masses,  No organomegaly. Extremities: Extremities normal, atraumatic, no cyanosis or edema. Skin: Skin color, texture, turgor normal. No rashes or lesions         Data Review:    CBC w/Diff    Recent Labs      01/16/18   0524  01/14/18   0601   WBC  5.2  9.1   RBC  3.85*  4.03*   HGB  11.8*  12.3*   HCT  36.1  37.5   MCV  93.8  93.1   MCH  30.6  30.5   MCHC  32.7  32.8   RDW  13.6  13.6    Recent Labs      01/16/18   0524  01/14/18   0601   MONOS  11*  7   EOS  3  0   BASOS  0  0   RDW  13.6  13.6        Comprehensive Metabolic Profile    Recent Labs      01/16/18   0524  01/15/18   0519  01/14/18   0601   NA  139  139  138   K  4.0  3.6  4.1   CL  101  100  99*   CO2  31  33*  35*   BUN  16  16  16   CREA  1.01  1.03  1.20    Recent Labs      01/16/18   0524  01/15/18   0519  01/14/18   0601  01/13/18   1400   CA  9.0  9.0  9.1  8.7   ALB  3.0*   --    --   3.7   TP  6.2*   --    --   6.6   SGOT  40*   --    --   86*   TBILI  0.5   --    --   0.4                      Impression:       Active Hospital Problems    Diagnosis Date Noted    Hypokalemia 62/86/7584    Metabolic alkalosis 85/12/8927    DM type 2 (diabetes mellitus, type 2) ([facility] 23/78/5916    Systolic dysfunction 11/41/9822    Cardiac arrest ([facility] 01/12/2018    Contusion 01/12/2018            Plan:     Continue  current medicine, ok  To DC  From renal point.       Hadley Andino MD

## 2018-01-16 NOTE — ROUTINE PROCESS
Bedside and Verbal shift change report given to MITCHEL Camarena (oncoming nurse) by GEORGE Lopez (offgoing nurse). Report included the following information SBAR, Intake/Output, MAR and Recent Results.

## 2018-01-16 NOTE — ROUTINE PROCESS
Shift Summary: pt VSS. AV paced on room air. Urine output adequate. Slept well. Possible discharge today.

## 2018-01-16 NOTE — PROGRESS NOTES
0030-Visual handoff from night RN. Pt assessment per flowchart. Pt doing well, in good spirits, hoping to go home today. Paced on monitor, 58-60, BP at baselines. Denies pain. No BM for 5+days, pt states that this is something he has struggled with in past, constipation, will notify primary team. Pt already on PO bowel care. 1430-Tap water enema given per orders with pt lying in bed on left side. 1530-Tap water effective for pt to have BM. 1545-Pt dressed, up ad juan on unit with difficulty, steady gait. Anxious to go home. 1600-PIV dc'd with cath tip intact. [id number] discharge instructions discussed regarding importance of follow up appts, lab checks and med regime. Education pamphlets on AICD, Afib and electrolyte imbalance. Rx provided for colace, miralax and aspirin. Papers and Rx provided in folder. All pt's home meds that he brought in hospital, packaged with pt discharge materials. 1645-Pt discharged to front lobby via w/c with CNA assist. Personal belongings and d/c papers in tote. Pt's brother here to take pt home.

## 2018-01-16 NOTE — DISCHARGE SUMMARY
Discharge Summary    Patient: Chuck Saab MRN: [id number]  CSN: [id number]    YOB: 1950  Age: 74 y.o. Sex: male    DOA: 1/12/2018 LOS:  LOS: 4 days   Discharge Date:      Admission Diagnoses: Cardiac arrest [facility])  Contusion    Discharge Diagnoses:    Problem List as of 1/16/2018  Date Reviewed: 1/14/2018          Codes Class Noted - Resolved    Hypokalemia ICD-10-CM: E87.6  ICD-9-CM: 276.8  1/14/2018 - Present        Metabolic alkalosis XCS-81-ZT: E87.3  ICD-9-CM: 276.3  1/14/2018 - Present        DM type 2 (diabetes mellitus, type 2) ([facility] ICD-10-CM: E11.9  ICD-9-CM: 250.00  1/14/2018 - Present        Systolic dysfunction PVK-06-KP: I51.9  ICD-9-CM: 429.9  1/14/2018 - Present        Cardiac arrest ([facility] ICD-10-CM: I46.9  ICD-9-CM: 427.5  1/12/2018 - Present        Contusion ICD-10-CM: T14. 8XXA  ICD-9-CM: 924.9  1/12/2018 - Present            Reason for Admission   74 y.o.   male with a PMH of Ischemic cardiomyopathy with EF 20%, s/p BI-V ICD, chronic systolic CHF, ventricular tachycardia, CAD s/p CABG, HTN and DM-2 who presented to the ED for ICD shock. He was recently admitted 12/11/17-12/13/17 at [facility] for ventricular tachycardia and discharged home on amiodarone. Apparently his ICD shocked him earlier that day, after which he fell down in the parking lot and become unresponsive. Per EMS, patient had no pulse and his rhythm was Vtac. His device was discharged. Here in ED he was starting to wake up and was able to communicate. He denied chest pain. Cardiology and intensivist were consulted. Discharge Condition: Good    PHYSICAL EXAM at discharge. Visit Vitals    BP 98/55    Pulse 64    Temp 97.7 °F (36.5 °C)    Resp 23    Ht 6' 2\" (1.88 m)    Wt 86.4 kg (190 lb 7.6 oz)    SpO2 100%    BMI 24.46 kg/m2     General:  Awake, alert and oriented. Nad. Heent: ncat. Perrl. Cardiovascular:  S1S2+, RRR  Pulmonary:  CTA b/l  GI:  Soft, NT, ND nabs. Extremities:  No edema  Neuro : no focal deficit. Ambulating independently and w/o difficulty  Skin no rash    Hospital Course:   1. V-tach cardiac arrest s/p ICD shock - on PO amiodarone  2. Recurrent Vent tachycardia recently admitted over SPA, on amiodarone. 3. Ischemic cardiomyopathy with EF 20%, s/p BI-V ICD. On coreg. Not on ACE or ARB given low blood pressures, needs outpatient f/u   4. Chronic systolic CHF: coreg with hold parameters. 5. Paroxysmal Afib: NSR on tele, on eliquis  6. CAD  7. HTN, controlled: BP low, only use coreg per parameters. Not on ace/arb 2/2 low normal bp  8. DM-2: SSI given in house; resume oral hypoglycemics at discharge. 10. Acute renal insufficiency: improving nephro consult appreciated.    11. Metabolic alkalosis: improving over hospital course  12. Elevated liver enzymes trending down. 13. Indeterminate left renal lesion seen incidentally on CT could represent a hemorrhagic cyst or solid  nodule. This could be assessed with ultrasound, and if still remains indeterminate then renal mass protocol CT or MRI - follow up imaging to be done as outpatient. 14. Severe Protein Calorie Malnutrition AEB  ASPEN Malnutrition Criteria  Acute Illness, Chronic Illness, or Social/Enviornmental: Chronic illness  Energy Intake: Less than/equal to 75% of est energy req for greater than/equal to 1 month  Weight Loss: Greater than 20% x 1 yr  ASPEN Malnutrition Score - Chronic Illness: 12  Chronic Illness - Malnutrition Diagnosis: Severe malnutrition.    --> on supplements per nutritionist.   15. dvt prophylaxis was given in the form of eliquis. 16. Full code. Patient wishes to follow closely with his primary cardiologist. Discharge to home. Patient agrees; all questions answered to the best of my ability.      Consults: Cardiology Dr. Sarah Escalona    Significant Diagnostic Studies: labs:   Recent Results (from the past 24 hour(s))   GLUCOSE, POC    Collection Time: 01/15/18  5:05 PM   Result Value Ref Range    Glucose (POC) 126 (H) 70 - 110 mg/dL   GLUCOSE, POC    Collection Time: 01/15/18  9:06 PM   Result Value Ref Range    Glucose (POC) 168 (H) 70 - 673 mg/dL   METABOLIC PANEL, COMPREHENSIVE    Collection Time: 01/16/18  5:24 AM   Result Value Ref Range    Sodium 139 136 - 145 mmol/L    Potassium 4.0 3.5 - 5.5 mmol/L    Chloride 101 100 - 108 mmol/L    CO2 31 21 - 32 mmol/L    Anion gap 7 3.0 - 18 mmol/L    Glucose 157 (H) 74 - 99 mg/dL    BUN 16 7.0 - 18 MG/DL    Creatinine 1.01 0.6 - 1.3 MG/DL    BUN/Creatinine ratio 16 12 - 20      GFR est AA >60 >60 ml/min/1.73m2    GFR est non-AA >60 >60 ml/min/1.73m2    Calcium 9.0 8.5 - 10.1 MG/DL    Bilirubin, total 0.5 0.2 - 1.0 MG/DL    ALT (SGPT) 79 (H) 16 - 61 U/L    AST (SGOT) 40 (H) 15 - 37 U/L    Alk. phosphatase 63 45 - 117 U/L    Protein, total 6.2 (L) 6.4 - 8.2 g/dL    Albumin 3.0 (L) 3.4 - 5.0 g/dL    Globulin 3.2 2.0 - 4.0 g/dL    A-G Ratio 0.9 0.8 - 1.7     CBC WITH AUTOMATED DIFF    Collection Time: 01/16/18  5:24 AM   Result Value Ref Range    WBC 5.2 4.6 - 13.2 K/uL    RBC 3.85 (L) 4.70 - 5.50 M/uL    HGB 11.8 (L) 13.0 - 16.0 g/dL    HCT 36.1 36.0 - 48.0 %    MCV 93.8 74.0 - 97.0 FL    MCH 30.6 24.0 - 34.0 PG    MCHC 32.7 31.0 - 37.0 g/dL    RDW 13.6 11.6 - 14.5 %    PLATELET 162 112 - 811 K/uL    MPV 10.7 9.2 - 11.8 FL    NEUTROPHILS 51 40 - 73 %    LYMPHOCYTES 35 21 - 52 %    MONOCYTES 11 (H) 3 - 10 %    EOSINOPHILS 3 0 - 5 %    BASOPHILS 0 0 - 2 %    ABS. NEUTROPHILS 2.6 1.8 - 8.0 K/UL    ABS. LYMPHOCYTES 1.8 0.9 - 3.6 K/UL    ABS. MONOCYTES 0.6 0.05 - 1.2 K/UL    ABS. EOSINOPHILS 0.1 0.0 - 0.4 K/UL    ABS. BASOPHILS 0.0 0.0 - 0.06 K/UL    DF AUTOMATED     GLUCOSE, POC    Collection Time: 01/16/18  8:43 AM   Result Value Ref Range    Glucose (POC) 157 (H) 70 - 110 mg/dL     All Micro Results     None        IMAGING    XR Results (maximum last 3):     Results from [facility] encounter on 01/12/18   XR CHEST PORT   Narrative CHEST PORTABLE    CPT CODE: 18132    COMPARISON: None    INDICATIONS: Cardiac arrest    FINDINGS: There is a left-sided 3-lead ICD. Status post median sternotomy. The  heart is enlarged. The aorta is calcified. Lung volumes are low. No definite  infiltrates are seen. There is no convincing evidence of heart failure. Impression Impression:    Low lung volumes. Cardiomegaly with no definite evidence of pneumonia or heart  failure at this time. CT Results (maximum last 3): Results from Hospital Encounter encounter on 01/12/18   CT CHEST WO CONT   Narrative CT CHEST WITHOUT ENHANCEMENT    INDICATION: Ventricular tachycardia with cardiac arrest requiring resuscitation. Rib fracture assessment. TECHNIQUE: Axial images obtained from the thoracic inlet to the level of the  diaphragm without intravenous contrast. Coronal and sagittal reformatted images  were obtained. All CT scans are performed using dose optimization techniques as  appropriate to the performed exam including the following: Automated exposure  control, adjustment of mA and/or kV according to patient size, and use of  iterative reconstructive technique. COMPARISON: No prior CT. Chest film 1/12/2018. CHEST FINDINGS:   The evaluation of the mediastinum, hilum and vascular structures is limited in  the absence of intravenous contrast.    Lungs: Minimal dependent atelectasis. Punctate left lower lobe incidental  calcified granuloma. Pleura: No pneumothorax. No pleural effusion. Pericardium/ Heart: No significant effusion. ICD/pacing device. CABG. Coronary  artery disease. Mild cardiomegaly. Lymph Nodes: Unremarkable. .  Aorta/ Vessels: Arterial calcifications. No aortic aneurysm. Thyroid: Unremarkable in its visualized aspects. Bones/soft tissues: Sternotomy. No notable chest wall hematoma. No definitive  acute rib fracture. Upper Abdomen: Cholelithiasis. 1 cm hyperdense left renal upper pole nodule.            Impression IMPRESSION:    1. No acute complication of cardiac resuscitation identified. No definitive rib  fractures. 2. Mild cardiomegaly with ICD/pacing device and CABG. 3. Cholelithiasis. 4. Indeterminate left renal lesion could represent a hemorrhagic cyst or solid  nodule. This could be assessed with ultrasound, and if still remains  indeterminate then renal mass protocol CT or MRI. CT SPINE CERV WO CONT   Narrative CT cervical spine without contrast     CPT CODE: 04335    HISTORY: Status post fall    COMPARISON: None. TECHNIQUE: Helical axial images to the cervical spine are obtained without  intrathecal contrast. Sagittal and coronal reconstructions were obtained to  better evaluate alignment, disc space heights, interfacet relations and the  vertebral integrity. All CT scans at this facility performed using dose optimization techniques as  appreciated to a performed exam, to include automated exposure control,  adjustment of the mA and or KU according to patient size (including appropriate  matching for site specific examination), or use of iterative reconstruction  technique. FINDINGS: Moderate spondylosis at lower C-spine at C5-6 and multilevel mild  posterior osteophytes noted in the cervical spine. The vertebral column and  alignment of the cervical spine are unremarkable. No evidence of acute  compression fracture or listhesis identified. The odontoid and C1-2 relationship  appear within normal.  Moderate facet disease at C3-4 on the right with  associated mild bony foramina stenosis. The prevertebral soft tissue space  appears unremarkable. Impression IMPRESSION:    No CT evidence of acute C-spine injury seen. Mild spondylosis as above. Thank you for your referral.      CT HEAD WO CONT   Narrative CT of the head without contrast    CT CODE:  17469    HISTORY: Syncope, respiratory distress    COMPARISON: None.     TECHNIQUE: Helical axial scan to the head was performed from the skull base to  the vertex without IV contrast administration. All CT scans at this facility performed using dose optimization techniques as  appreciated to a performed exam, to include automated exposure control,  adjustment of the mA and or KU according to patient size (including appropriate  matching for site specific examination), or use of iterative reconstruction  technique. FINDINGS:    The brain parenchyma, ventricles and calvarium appear unremarkable. There is no  evidence of acute intracranial hemorrhage or mass effect identified. No skull  fracture or extra axial fluid collections identified. Mild contusion  demonstrated at posterior right parietal scalp. Partial opacification of left  maxillary sinus with air-fluid level and heterogeneous secretion with  calcifications noted. Impression IMPRESSION:    No acute intracranial abnormality. Note: If clinical concern of acute stroke, MRI with diffusion weighted images is  recommended for better evaluation. Mild contusion at right posterior parietal scalp without skull fracture. Correlate clinically.     Chronic left maxillary sinusitis with air-fluid level and partially calcified  mucous material.    Thank you for your referral.         EKG Results     Procedure 720 Value Units Date/Time    NUCLEAR STRESS TEST [[id number]]     Order Status:  Canceled     CARDIAC SPECT REST AND STRESS [[id number]]     Order Status:  Canceled     EKG, 12 LEAD, INITIAL [[id number]] Collected:  01/12/18 1555    Order Status:  Completed Updated:  01/13/18 0818     Ventricular Rate 68 BPM      Atrial Rate 68 BPM      P-R Interval 204 ms      QRS Duration 136 ms      Q-T Interval 602 ms      QTC Calculation (Bezet) 640 ms      Calculated P Axis 78 degrees      Calculated R Axis -132 degrees      Calculated T Axis -8 degrees      Diagnosis --     Electronic ventricular pacemaker  No previous ECGs available  Confirmed by Chen Arriaga [id number]) on 1/13/2018 8:18:21 AM      EKG, 12 LEAD, SUBSEQUENT [[id number]] Collected:  01/13/18 0607    Order Status:  Completed Updated:  01/13/18 0818     Ventricular Rate 68 BPM      Atrial Rate 68 BPM      P-R Interval 212 ms      QRS Duration 158 ms      Q-T Interval 798 ms      QTC Calculation (Bezet) 848 ms      Calculated P Axis -16 degrees      Calculated R Axis -121 degrees      Calculated T Axis -65 degrees      Diagnosis --     Electronic ventricular pacemaker  When compared with ECG of 12-JAN-2018 15:55,  No significant change was found  Confirmed by Sanaz Wheeler [phone]) on 1/13/2018 8:18:14 AM          Discharge Medications:     Current Discharge Medication List      START taking these medications    Details   aspirin 81 mg chewable tablet Take 1 Tab by mouth daily. Qty: 30 Tab, Refills: 2      polyethylene glycol (MIRALAX) 17 gram packet Take 1 Packet by mouth daily. Qty: 30 Packet, Refills: 2      spironolactone (ALDACTONE) 25 mg tablet Take 1 Tab by mouth daily. Qty: 30 Tab, Refills: 2         CONTINUE these medications which have NOT CHANGED    Details   allopurinol (ZYLOPRIM) 100 mg tablet Take 200 mg by mouth every evening. Indications: prevention of acute gout attack, 300mg in am/ 200 mg in the evening      allopurinol (ZYLOPRIM) 300 mg tablet Take 300 mg by mouth daily. Indications: Total = 500mg daily      famotidine (PEPCID) 20 mg tablet Take 20 mg by mouth two (2) times a day. magnesium oxide (MAG-OX) 400 mg tablet Take 400 mg by mouth daily. amiodarone (CORDARONE) 200 mg tablet Take 200 mg by mouth two (2) times daily (with meals). cyclobenzaprine (FLEXERIL) 10 mg tablet Take 10 mg by mouth three (3) times daily as needed for Muscle Spasm(s). potassium chloride SA (MICRO-K) 10 mEq capsule Take 20 mEq by mouth two (2) times a day. atorvastatin (LIPITOR) 20 mg tablet Take 20 mg by mouth nightly. carvedilol (COREG) 3.125 mg tablet Take  by mouth two (2) times daily (with meals). glimepiride (AMARYL) 4 mg tablet Take 4 mg by mouth two (2) times daily (with meals). senna (SENNA) 8.6 mg tablet Take 1 Tab by mouth two (2) times daily as needed for Constipation. docusate sodium (COLACE) 100 mg capsule Take 100 mg by mouth daily. metFORMIN (GLUCOPHAGE) 850 mg tablet Take  by mouth two (2) times a day. apixaban (ELIQUIS) 5 mg tablet Take 5 mg by mouth every twelve (12) hours. Indications: PREVENT THROMBOEMBOLISM IN CHRONIC ATRIAL FIBRILLATION            STOP taking these medications       torsemide (DEMADEX) 20 mg tablet Comments:   Reason for Stopping:               Activity: Activity as tolerated    Diet: Cardiac Diet and Diabetic Diet    Wound Care: None needed    Follow-up:   1. Dr. Izabel Shaver 7 - 10 days  2.  Dr. Marcia Mandel this week    Minutes spent on discharge: greater than 30

## 2018-01-16 NOTE — PROGRESS NOTES
[facility]      CARDIOLOGY PROGRESS NOTE  RECS:    1. Ventricular tachycardia with cardiac arrest, requiring resuscitation with requirement of shocks. Recurrent Vtach due to profound hypokalemia- which is now resolved. Continue with Amiodarone 200 mg bid for 14 days then 200  Amiodarone. 2.  Ischemic cardiomyopathy with severely reduced systolic function and ejection fraction of 20% on echocardiogram in December of 2017. Not on ACE-I due to low BP  3. Coronary artery disease with old myocardial infarction with extensive ischemic cardiomyopathy- denies chest pain. - will proceed with stress test tomorrow. 4.  History of paroxysmal atrial fibrillation-   5. Hypokalemia- give additional KCl 20meq today  6. Acute renal insufficiency with increased creatinine- improving  7. Elevated liver enzymes, etiology unclear-will resume statin after LFTs    Patient with Vtach from hypokalemia which has resolved  He declined stress test here and wants to follow up with his cardiologist.  Can be discharged home after ambulation      ASSESSMENT:  Hospital Problems  Date Reviewed: 1/14/2018          Codes Class Noted POA    Hypokalemia ICD-10-CM: E87.6  ICD-9-CM: 276.8  1/14/2018 Unknown        Metabolic alkalosis PVX-94-OF: E87.3  ICD-9-CM: 276.3  1/14/2018 Clinically Undetermined        DM type 2 (diabetes mellitus, type 2) ([facility] ICD-10-CM: E11.9  ICD-9-CM: 250.00  1/14/2018 Unknown        Systolic dysfunction YGA-95-WQ: I51.9  ICD-9-CM: 429.9  1/14/2018 Unknown        Cardiac arrest ([facility] ICD-10-CM: I46.9  ICD-9-CM: 427.5  1/12/2018 Unknown        Contusion ICD-10-CM: T14. 8XXA  ICD-9-CM: 924.9  1/12/2018 Unknown                SUBJECTIVE:    No CP or SOB  Feels week     OBJECTIVE:    VS:   Visit Vitals    /66    Pulse (!) 58    Temp 97.7 °F (36.5 °C)    Resp 12    Ht 6' 2\" (1.88 m)    Wt 86.4 kg (190 lb 7.6 oz)    SpO2 98%    BMI 24.46 kg/m2         Intake/Output Summary (Last 24 hours) at 01/16/18 0820  Last data filed at 01/16/18 0000   Gross per 24 hour   Intake              360 ml   Output              400 ml   Net              -40 ml     TELE: normal sinus rhythm    General: in no apparent distress  HENT: Normocephalic, atraumatic. Normal external eye. Neck :  negative  Cardiac:  regular rate and rhythm, S1, S2 normal, no murmur, click, rub or gallop  Lungs: clear to auscultation bilaterally  Abdomen: Soft, nontender, no masses  Extremities:  No edema      Labs: Results:       Chemistry Recent Labs      01/16/18   0524  01/15/18   0519  01/14/18   0601  01/13/18   1400   GLU  157*  155*  145*  198*   NA  139  139  138  138   K  4.0  3.6  4.1  3.9   CL  101  100  99*  99*   CO2  31  33*  35*  33*   BUN  16 16 16 18   CREA  1.01  1.03  1.20  1.34*   CA  9.0  9.0  9.1  8.7   AGAP  7  6  4  6   BUCR  16  16 13  13   AP  63   --    --   62   TP  6.2*   --    --   6.6   ALB  3.0*   --    --   3.7   GLOB  3.2   --    --   2.9   AGRAT  0.9   --    --   1.3      CBC w/Diff Recent Labs      01/16/18   0524  01/14/18   0601   WBC  5.2  9.1   RBC  3.85*  4.03*   HGB  11.8*  12.3*   HCT  36.1  37.5   PLT  165  175   GRANS  51  66   LYMPH  35  27   EOS  3  0      Cardiac Enzymes Recent Labs      01/13/18   0915   CPK  286   CKND1  CALCULATION NOT PERFORMED WHEN RESULT IS BELOW LINEAR LIMIT      Coagulation No results for input(s): PTP, INR, APTT in the last 72 hours. No lab exists for component: INREXT, INREXT    Lipid Panel No results found for: CHOL, CHOLPOCT, CHOLX, CHLST, CHOLV, 942308, HDL, LDL, LDLC, DLDLP, 847510, VLDLC, VLDL, TGLX, TRIGL, TRIGP, TGLPOCT, CHHD, CHHDX   BNP No results for input(s): BNPP in the last 72 hours.    Liver Enzymes Recent Labs      01/16/18   0524   TP  6.2*   ALB  3.0*   AP  63   SGOT  40*      Thyroid Studies No results found for: T4, T3U, TSH, TSHEXT, TSHEXT               Yanna Gamble MD

## 2018-01-16 NOTE — PROGRESS NOTES
conducted an initial consultation and Spiritual Assessment for Pete Hoyos, who is a 74 y. o.,male. Patients Primary Language is: [place]. According to the patients EMR Scientology Affiliation is: [employer].     The reason the Patient came to the hospital is:   Patient Active Problem List    Diagnosis Date Noted    Hypokalemia 36/78/9361    Metabolic alkalosis 30/16/6885    DM type 2 (diabetes mellitus, type 2) ([facility] 86/59/7845    Systolic dysfunction 27/11/1447    [facility] 01/12/2018    Contusion 01/12/2018        The  provided the following Interventions:  Initiated a relationship of care and support to patient and brother. Explored issues of zaira, belief, spirituality and Anglican/ritual needs while hospitalized. Listened empathically to stories of patient and brother about life, growing up, family and medical treatment and recovery. Provided information about Spiritual Care Services. Offered prayer and assurance of continued prayers on patient's behalf. The following outcomes where achieved:  Patient shared limited information about both their medical narrative and spiritual journey/beliefs.  confirmed Patient's Scientology Affiliation: [employer] [facility] orientation. Patient processed feeling about current hospitalization. Patient expressed gratitude for 's visit. Assessment:  Patient does not have any Anglican/cultural needs that will affect patients preferences in health care. There are no spiritual or Anglican issues which require intervention at this time. Plan:  Chaplains will continue to follow and will provide pastoral care on an as needed/requested basis.  recommends bedside caregivers page  on duty if patient shows signs of acute spiritual or emotional distress.       Abdoul Hernandez, [address] [place] [address]  Spiritual Care  [phone]

## 2018-01-17 ENCOUNTER — TELEPHONE (OUTPATIENT)
Dept: CASE MANAGEMENT | Age: 68
End: 2018-01-17

## 2018-01-18 LAB
ALDOST SERPL-MCNC: 7.9 NG/DL (ref 0–30)
RENIN PLAS-CCNC: 0.94 NG/ML/HR (ref 0.17–5.38)
SPECIMEN SOURCE: NORMAL